# Patient Record
Sex: FEMALE | Race: WHITE | Employment: UNEMPLOYED | URBAN - METROPOLITAN AREA
[De-identification: names, ages, dates, MRNs, and addresses within clinical notes are randomized per-mention and may not be internally consistent; named-entity substitution may affect disease eponyms.]

---

## 2017-12-01 ENCOUNTER — HOSPITAL ENCOUNTER (OUTPATIENT)
Dept: GENERAL RADIOLOGY | Age: 22
Discharge: HOME | End: 2017-12-01
Payer: MEDICARE

## 2017-12-01 ENCOUNTER — TELEPHONE (OUTPATIENT)
Dept: FAMILY MEDICINE CLINIC | Age: 22
End: 2017-12-01

## 2017-12-01 ENCOUNTER — OFFICE VISIT (OUTPATIENT)
Dept: FAMILY MEDICINE CLINIC | Age: 22
End: 2017-12-01
Payer: MEDICARE

## 2017-12-01 ENCOUNTER — HOSPITAL ENCOUNTER (OUTPATIENT)
Age: 22
Discharge: HOME | End: 2017-12-01
Payer: MEDICARE

## 2017-12-01 VITALS
DIASTOLIC BLOOD PRESSURE: 74 MMHG | HEART RATE: 84 BPM | OXYGEN SATURATION: 98 % | SYSTOLIC BLOOD PRESSURE: 116 MMHG | HEIGHT: 63 IN | BODY MASS INDEX: 28.97 KG/M2 | WEIGHT: 163.5 LBS | TEMPERATURE: 97.8 F

## 2017-12-01 DIAGNOSIS — G89.29 CHRONIC LOW BACK PAIN, UNSPECIFIED BACK PAIN LATERALITY, WITH SCIATICA PRESENCE UNSPECIFIED: ICD-10-CM

## 2017-12-01 DIAGNOSIS — Z11.4 SCREENING FOR HIV (HUMAN IMMUNODEFICIENCY VIRUS): ICD-10-CM

## 2017-12-01 DIAGNOSIS — M54.5 CHRONIC LOW BACK PAIN, UNSPECIFIED BACK PAIN LATERALITY, WITH SCIATICA PRESENCE UNSPECIFIED: ICD-10-CM

## 2017-12-01 DIAGNOSIS — Z00.00 ANNUAL PHYSICAL EXAM: Primary | ICD-10-CM

## 2017-12-01 DIAGNOSIS — N63.0 BREAST MASS IN FEMALE: ICD-10-CM

## 2017-12-01 DIAGNOSIS — Z00.00 ANNUAL PHYSICAL EXAM: ICD-10-CM

## 2017-12-01 DIAGNOSIS — N63.10 BREAST MASS, RIGHT: Primary | ICD-10-CM

## 2017-12-01 LAB
ABSOLUTE EOS #: 0.25 K/UL (ref 0–0.44)
ABSOLUTE IMMATURE GRANULOCYTE: 0.03 K/UL (ref 0–0.3)
ABSOLUTE LYMPH #: 2.77 K/UL (ref 1.1–3.7)
ABSOLUTE MONO #: 0.52 K/UL (ref 0.1–1.2)
ALBUMIN SERPL-MCNC: 4.3 G/DL (ref 3.5–5.2)
ALBUMIN/GLOBULIN RATIO: 1.4 (ref 1–2.5)
ALP BLD-CCNC: 71 U/L (ref 35–104)
ALT SERPL-CCNC: 46 U/L (ref 5–33)
ANION GAP SERPL CALCULATED.3IONS-SCNC: 12 MMOL/L (ref 9–17)
AST SERPL-CCNC: 30 U/L
BASOPHILS # BLD: 1 % (ref 0–2)
BASOPHILS ABSOLUTE: 0.05 K/UL (ref 0–0.2)
BILIRUB SERPL-MCNC: 0.48 MG/DL (ref 0.3–1.2)
BUN BLDV-MCNC: 10 MG/DL (ref 6–20)
BUN/CREAT BLD: ABNORMAL (ref 9–20)
CALCIUM SERPL-MCNC: 9 MG/DL (ref 8.6–10.4)
CHLORIDE BLD-SCNC: 101 MMOL/L (ref 98–107)
CHOLESTEROL/HDL RATIO: 3.1
CHOLESTEROL: 179 MG/DL
CO2: 25 MMOL/L (ref 20–31)
CONTROL: PRESENT
CREAT SERPL-MCNC: 0.48 MG/DL (ref 0.5–0.9)
DIFFERENTIAL TYPE: NORMAL
EOSINOPHILS RELATIVE PERCENT: 3 % (ref 1–4)
GFR AFRICAN AMERICAN: >60 ML/MIN
GFR NON-AFRICAN AMERICAN: >60 ML/MIN
GFR SERPL CREATININE-BSD FRML MDRD: ABNORMAL ML/MIN/{1.73_M2}
GFR SERPL CREATININE-BSD FRML MDRD: ABNORMAL ML/MIN/{1.73_M2}
GLUCOSE BLD-MCNC: 80 MG/DL (ref 70–99)
HCT VFR BLD CALC: 38.4 % (ref 36.3–47.1)
HDLC SERPL-MCNC: 57 MG/DL
HEMOGLOBIN: 12 G/DL (ref 11.9–15.1)
HIV AG/AB: NONREACTIVE
IMMATURE GRANULOCYTES: 0 %
LDL CHOLESTEROL: 108 MG/DL (ref 0–130)
LYMPHOCYTES # BLD: 36 % (ref 24–43)
MCH RBC QN AUTO: 28.7 PG (ref 25.2–33.5)
MCHC RBC AUTO-ENTMCNC: 31.3 G/DL (ref 28.4–34.8)
MCV RBC AUTO: 91.9 FL (ref 82.6–102.9)
MONOCYTES # BLD: 7 % (ref 3–12)
PDW BLD-RTO: 12.9 % (ref 11.8–14.4)
PLATELET # BLD: 285 K/UL (ref 138–453)
PLATELET ESTIMATE: NORMAL
PMV BLD AUTO: 10.5 FL (ref 8.1–13.5)
POTASSIUM SERPL-SCNC: 4.1 MMOL/L (ref 3.7–5.3)
PREGNANCY TEST URINE, POC: NORMAL
RBC # BLD: 4.18 M/UL (ref 3.95–5.11)
RBC # BLD: NORMAL 10*6/UL
SEG NEUTROPHILS: 53 % (ref 36–65)
SEGMENTED NEUTROPHILS ABSOLUTE COUNT: 4.11 K/UL (ref 1.5–8.1)
SODIUM BLD-SCNC: 138 MMOL/L (ref 135–144)
TOTAL PROTEIN: 7.4 G/DL (ref 6.4–8.3)
TRIGL SERPL-MCNC: 68 MG/DL
TSH SERPL DL<=0.05 MIU/L-ACNC: 2.85 MIU/L (ref 0.3–5)
VITAMIN D 25-HYDROXY: 8.3 NG/ML (ref 30–100)
VLDLC SERPL CALC-MCNC: NORMAL MG/DL (ref 1–30)
WBC # BLD: 7.7 K/UL (ref 3.5–11.3)
WBC # BLD: NORMAL 10*3/UL

## 2017-12-01 PROCEDURE — 87389 HIV-1 AG W/HIV-1&-2 AB AG IA: CPT

## 2017-12-01 PROCEDURE — G8427 DOCREV CUR MEDS BY ELIG CLIN: HCPCS | Performed by: PHYSICIAN ASSISTANT

## 2017-12-01 PROCEDURE — G8484 FLU IMMUNIZE NO ADMIN: HCPCS | Performed by: PHYSICIAN ASSISTANT

## 2017-12-01 PROCEDURE — 36415 COLL VENOUS BLD VENIPUNCTURE: CPT

## 2017-12-01 PROCEDURE — 72220 X-RAY EXAM SACRUM TAILBONE: CPT

## 2017-12-01 PROCEDURE — 85025 COMPLETE CBC W/AUTO DIFF WBC: CPT

## 2017-12-01 PROCEDURE — 1036F TOBACCO NON-USER: CPT | Performed by: PHYSICIAN ASSISTANT

## 2017-12-01 PROCEDURE — 81025 URINE PREGNANCY TEST: CPT | Performed by: PHYSICIAN ASSISTANT

## 2017-12-01 PROCEDURE — 80053 COMPREHEN METABOLIC PANEL: CPT

## 2017-12-01 PROCEDURE — 72100 X-RAY EXAM L-S SPINE 2/3 VWS: CPT

## 2017-12-01 PROCEDURE — 80061 LIPID PANEL: CPT

## 2017-12-01 PROCEDURE — 82306 VITAMIN D 25 HYDROXY: CPT

## 2017-12-01 PROCEDURE — 99203 OFFICE O/P NEW LOW 30 MIN: CPT | Performed by: PHYSICIAN ASSISTANT

## 2017-12-01 PROCEDURE — 84443 ASSAY THYROID STIM HORMONE: CPT

## 2017-12-01 PROCEDURE — G8419 CALC BMI OUT NRM PARAM NOF/U: HCPCS | Performed by: PHYSICIAN ASSISTANT

## 2017-12-01 RX ORDER — MELOXICAM 7.5 MG/1
7.5 TABLET ORAL DAILY
Qty: 30 TABLET | Refills: 3 | Status: SHIPPED | OUTPATIENT
Start: 2017-12-01 | End: 2018-03-06 | Stop reason: ALTCHOICE

## 2017-12-01 ASSESSMENT — ENCOUNTER SYMPTOMS
EYE DISCHARGE: 0
RHINORRHEA: 0
ABDOMINAL PAIN: 0
EYE ITCHING: 0
BACK PAIN: 1
BOWEL INCONTINENCE: 0
COUGH: 0
VOMITING: 0
SHORTNESS OF BREATH: 0
CHEST TIGHTNESS: 0
SINUS PRESSURE: 0
DIARRHEA: 0
SORE THROAT: 0
NAUSEA: 0

## 2017-12-01 ASSESSMENT — PATIENT HEALTH QUESTIONNAIRE - PHQ9
2. FEELING DOWN, DEPRESSED OR HOPELESS: 0
SUM OF ALL RESPONSES TO PHQ QUESTIONS 1-9: 0
SUM OF ALL RESPONSES TO PHQ9 QUESTIONS 1 & 2: 0
1. LITTLE INTEREST OR PLEASURE IN DOING THINGS: 0

## 2017-12-01 NOTE — PROGRESS NOTES
ear discharge, ear pain, postnasal drip, rhinorrhea, sinus pressure and sore throat. Eyes: Negative for discharge and itching. Respiratory: Negative for cough, chest tightness and shortness of breath. Cardiovascular: Negative for chest pain, palpitations and leg swelling. Gastrointestinal: Negative for abdominal pain, bowel incontinence, diarrhea, nausea and vomiting. Genitourinary: Negative for bladder incontinence, dysuria and frequency. Musculoskeletal: Positive for back pain. Negative for neck pain and neck stiffness. Skin: Negative for rash. Neurological: Negative for dizziness, weakness, light-headedness, numbness and headaches. All other systems reviewed and are negative. Objective:     Physical Exam   Constitutional: She is oriented to person, place, and time. She appears well-developed and well-nourished. No distress. /74   Pulse 84   Temp 97.8 °F (36.6 °C) (Oral)   Ht 5' 3\" (1.6 m)   Wt 163 lb 8 oz (74.2 kg)   LMP 11/28/2017   SpO2 98%   Breastfeeding? No   BMI 28.96 kg/m²      HENT:   Head: Normocephalic and atraumatic. Right Ear: External ear normal.   Left Ear: External ear normal.   Nose: Nose normal.   Mouth/Throat: Oropharynx is clear and moist.   Eyes: Conjunctivae and EOM are normal. Pupils are equal, round, and reactive to light. Right eye exhibits no discharge. Left eye exhibits no discharge. No scleral icterus. Neck: Normal range of motion. Neck supple. No tracheal deviation present. No thyromegaly present. Cardiovascular: Normal rate, regular rhythm and normal heart sounds. Exam reveals no gallop and no friction rub. No murmur heard. Pulmonary/Chest: Effort normal and breath sounds normal. No stridor. No respiratory distress. She has no wheezes. She has no rales. She exhibits no tenderness. Abdominal: Soft. Bowel sounds are normal. She exhibits no distension. There is no tenderness. There is no rebound and no guarding.    Musculoskeletal: She

## 2017-12-04 DIAGNOSIS — E55.9 VITAMIN D DEFICIENCY: Primary | ICD-10-CM

## 2017-12-04 RX ORDER — CHOLECALCIFEROL (VITAMIN D3) 1250 MCG
50000 CAPSULE ORAL WEEKLY
Qty: 12 CAPSULE | Refills: 0 | Status: SHIPPED | OUTPATIENT
Start: 2017-12-04 | End: 2018-03-06 | Stop reason: ALTCHOICE

## 2018-01-03 ENCOUNTER — HOSPITAL ENCOUNTER (OUTPATIENT)
Age: 23
Setting detail: SPECIMEN
Discharge: HOME | End: 2018-01-03
Payer: MEDICARE

## 2018-01-03 ENCOUNTER — OFFICE VISIT (OUTPATIENT)
Dept: OBGYN CLINIC | Age: 23
End: 2018-01-03
Payer: MEDICARE

## 2018-01-03 VITALS
SYSTOLIC BLOOD PRESSURE: 112 MMHG | OXYGEN SATURATION: 98 % | BODY MASS INDEX: 31.28 KG/M2 | HEART RATE: 93 BPM | RESPIRATION RATE: 14 BRPM | HEIGHT: 62 IN | TEMPERATURE: 98.1 F | WEIGHT: 170 LBS | DIASTOLIC BLOOD PRESSURE: 75 MMHG

## 2018-01-03 DIAGNOSIS — Z78.9 NONSMOKER: ICD-10-CM

## 2018-01-03 DIAGNOSIS — N92.6 MISSED MENSES: ICD-10-CM

## 2018-01-03 DIAGNOSIS — N92.6 MISSED MENSES: Primary | ICD-10-CM

## 2018-01-03 LAB
CONTROL: PRESENT
GLUCOSE ADMINISTRATION: NORMAL
GLUCOSE TOLERANCE SCREEN 50G: 113 MG/DL (ref 70–135)
PREGNANCY TEST URINE, POC: POSITIVE

## 2018-01-03 PROCEDURE — 1036F TOBACCO NON-USER: CPT | Performed by: NURSE PRACTITIONER

## 2018-01-03 PROCEDURE — G8427 DOCREV CUR MEDS BY ELIG CLIN: HCPCS | Performed by: NURSE PRACTITIONER

## 2018-01-03 PROCEDURE — 81025 URINE PREGNANCY TEST: CPT | Performed by: NURSE PRACTITIONER

## 2018-01-03 PROCEDURE — 99202 OFFICE O/P NEW SF 15 MIN: CPT | Performed by: NURSE PRACTITIONER

## 2018-01-03 PROCEDURE — G8484 FLU IMMUNIZE NO ADMIN: HCPCS | Performed by: NURSE PRACTITIONER

## 2018-01-03 PROCEDURE — G8417 CALC BMI ABV UP PARAM F/U: HCPCS | Performed by: NURSE PRACTITIONER

## 2018-01-03 RX ORDER — PNV NO.95/FERROUS FUM/FOLIC AC 28MG-0.8MG
1 TABLET ORAL DAILY
Qty: 30 TABLET | Refills: 12 | Status: SHIPPED | OUTPATIENT
Start: 2018-01-03 | End: 2018-03-06 | Stop reason: ALTCHOICE

## 2018-01-03 NOTE — PATIENT INSTRUCTIONS
Now\" link. Current as of: March 16, 2017  Content Version: 11.4  © 1810-5663 "[employer]". Care instructions adapted under license by [facility] ([facility]). If you have questions about a medical condition or this instruction, always ask your healthcare professional. [address] any warranty or liability for your use of this information. Patient Education        Learning About When to Call Your Doctor During Pregnancy (Up to 20 Weeks)  Your Care Instructions    It's common to have concerns about what might be a problem during pregnancy. Although most pregnant women don't have any serious problems, it's important to know when to call your doctor if you have certain symptoms. These are general suggestions. Your doctor may give you some more information about when to call. When to call your doctor (up to 20 weeks)  Call 911 anytime you think you may need emergency care. For example, call if:  · You passed out (lost consciousness). Call your doctor now or seek immediate medical care if:  · You have a fever. · You have vaginal bleeding. · You are dizzy or lightheaded, or you feel like you may faint. · You have symptoms of a urinary tract infection. These may include:  ¨ Pain or burning when you urinate. ¨ A frequent need to urinate without being able to pass much urine. ¨ Pain in the flank, which is just below the rib cage and above the waist on either side of the back. ¨ Blood in your urine. · You have belly pain. · You think you are having contractions. · You have a sudden release of fluid from your vagina. Watch closely for changes in your health, and be sure to contact your doctor if:  · You have vaginal discharge that smells bad. · You have other concerns about your pregnancy. Follow-up care is a key part of your treatment and safety. Be sure to make and go to all appointments, and call your doctor if you are having problems.  It's also a good idea to know your test results and keep a list of the medicines you take. Where can you learn more? Go to https://chpepiceweb.[email or web address] org and sign in to your [employer] account. Enter L172 in the [facility] box to learn more about \"Learning About When to Call Your Doctor During Pregnancy (Up to 20 Weeks). \"     If you do not have an account, please click on the \"Sign Up Now\" link. Current as of: March 16, 2017  Content Version: 11.4  © 2233-7777 [employer]. Care instructions adapted under license by [facility] ([facility]). If you have questions about a medical condition or this instruction, always ask your healthcare professional. [address] any warranty or liability for your use of this information. Patient Education        Nutrition During Pregnancy: Care Instructions  Your Care Instructions    Healthy eating when you are pregnant is important for you and your baby. It can help you feel well and have a successful pregnancy and delivery. During pregnancy your nutrition needs increase. Even if you have excellent eating habits, your doctor may recommend a multivitamin to make sure you get enough iron and folic acid. Many pregnant women wonder how much weight they should gain. In general, women who were at a healthy weight before they became pregnant should gain between 25 and 35 pounds. Women who were overweight before pregnancy are usually advised to gain 15 to 25 pounds. Women who were underweight before pregnancy are usually advised to gain 28 to 40 pounds. Your doctor will work with you to set a weight goal that is right for you. Gaining a healthy amount of weight helps you have a healthy baby. Follow-up care is a key part of your treatment and safety. Be sure to make and go to all appointments, and call your doctor if you are having problems. It's also a good idea to know your test results and keep a list of the medicines you take. How can you care for yourself at home?   · Eat

## 2018-01-22 ENCOUNTER — HOSPITAL ENCOUNTER (OUTPATIENT)
Age: 23
Discharge: HOME | End: 2018-01-22
Payer: MEDICARE

## 2018-01-22 ENCOUNTER — HOSPITAL ENCOUNTER (OUTPATIENT)
Dept: ULTRASOUND IMAGING | Age: 23
Discharge: HOME | End: 2018-01-22
Payer: MEDICARE

## 2018-01-22 DIAGNOSIS — O20.0 THREATENED MISCARRIAGE: ICD-10-CM

## 2018-01-22 DIAGNOSIS — O20.0 THREATENED MISCARRIAGE: Primary | ICD-10-CM

## 2018-01-22 DIAGNOSIS — N92.6 MISSED MENSES: ICD-10-CM

## 2018-01-22 LAB — HCG QUANTITATIVE: ABNORMAL IU/L

## 2018-01-22 PROCEDURE — 84702 CHORIONIC GONADOTROPIN TEST: CPT

## 2018-01-22 PROCEDURE — 76817 TRANSVAGINAL US OBSTETRIC: CPT

## 2018-01-22 PROCEDURE — 76801 OB US < 14 WKS SINGLE FETUS: CPT

## 2018-01-22 PROCEDURE — 36415 COLL VENOUS BLD VENIPUNCTURE: CPT

## 2018-01-25 ENCOUNTER — OFFICE VISIT (OUTPATIENT)
Dept: OBGYN CLINIC | Age: 23
End: 2018-01-25
Payer: MEDICARE

## 2018-01-25 ENCOUNTER — HOSPITAL ENCOUNTER (OUTPATIENT)
Age: 23
Setting detail: SPECIMEN
Discharge: HOME | End: 2018-01-25
Payer: MEDICARE

## 2018-01-25 VITALS
DIASTOLIC BLOOD PRESSURE: 70 MMHG | SYSTOLIC BLOOD PRESSURE: 118 MMHG | HEART RATE: 89 BPM | WEIGHT: 172 LBS | BODY MASS INDEX: 31.46 KG/M2 | TEMPERATURE: 98.4 F

## 2018-01-25 DIAGNOSIS — O02.1 MISSED ABORTION: Primary | ICD-10-CM

## 2018-01-25 DIAGNOSIS — O02.1 MISSED ABORTION: ICD-10-CM

## 2018-01-25 LAB
ABO/RH: NORMAL
ANTIBODY SCREEN: NEGATIVE
HCG QUANTITATIVE: ABNORMAL IU/L

## 2018-01-25 PROCEDURE — G8417 CALC BMI ABV UP PARAM F/U: HCPCS | Performed by: ADVANCED PRACTICE MIDWIFE

## 2018-01-25 PROCEDURE — 99213 OFFICE O/P EST LOW 20 MIN: CPT | Performed by: ADVANCED PRACTICE MIDWIFE

## 2018-01-25 PROCEDURE — G8427 DOCREV CUR MEDS BY ELIG CLIN: HCPCS | Performed by: ADVANCED PRACTICE MIDWIFE

## 2018-01-25 PROCEDURE — G8484 FLU IMMUNIZE NO ADMIN: HCPCS | Performed by: ADVANCED PRACTICE MIDWIFE

## 2018-01-25 PROCEDURE — 1036F TOBACCO NON-USER: CPT | Performed by: ADVANCED PRACTICE MIDWIFE

## 2018-01-25 NOTE — PROGRESS NOTES
CC: Follow up for US    HPI: Reviewed US and labs and discussed likelihood of fetal demise. Here w  and mother. She was tearful, they appear supportive. Explained labs and US results and expectations. Will repeat labs and check blood type as she is not sure of hers. Discussed expectant management if labs indicate miscarriage. Questions answered. She is hopeful and they desire pregnancy fairly soon if this does result in miscarriage. Questions answered about expectant management, [facility] is aware to call w any concerns. Dx: Missed AB    Plan: Labs and observation  Call w concerns  Return in 2 weeks if miscarriage    Over 50% of this 15 min visit spent on education and counseling regarding miscarriage and expectant management.

## 2018-01-29 ENCOUNTER — TELEPHONE (OUTPATIENT)
Dept: OBGYN CLINIC | Age: 23
End: 2018-01-29

## 2018-02-11 ENCOUNTER — TELEPHONE (OUTPATIENT)
Dept: OBGYN CLINIC | Age: 23
End: 2018-02-11

## 2018-02-13 ENCOUNTER — HOSPITAL ENCOUNTER (OUTPATIENT)
Age: 23
Setting detail: SPECIMEN
Discharge: HOME | End: 2018-02-13
Payer: MEDICARE

## 2018-02-13 ENCOUNTER — OFFICE VISIT (OUTPATIENT)
Dept: OBGYN CLINIC | Age: 23
End: 2018-02-13
Payer: MEDICARE

## 2018-02-13 VITALS
BODY MASS INDEX: 31.54 KG/M2 | TEMPERATURE: 98.1 F | RESPIRATION RATE: 14 BRPM | OXYGEN SATURATION: 98 % | HEIGHT: 63 IN | SYSTOLIC BLOOD PRESSURE: 126 MMHG | WEIGHT: 178 LBS | DIASTOLIC BLOOD PRESSURE: 78 MMHG | HEART RATE: 97 BPM

## 2018-02-13 DIAGNOSIS — Z67.20 TYPE B BLOOD, RH POSITIVE: ICD-10-CM

## 2018-02-13 DIAGNOSIS — O03.9 MISCARRIAGE: ICD-10-CM

## 2018-02-13 DIAGNOSIS — O03.9 MISCARRIAGE: Primary | ICD-10-CM

## 2018-02-13 LAB — HCG QUANTITATIVE: 327 IU/L

## 2018-02-13 PROCEDURE — G8427 DOCREV CUR MEDS BY ELIG CLIN: HCPCS | Performed by: NURSE PRACTITIONER

## 2018-02-13 PROCEDURE — G8484 FLU IMMUNIZE NO ADMIN: HCPCS | Performed by: NURSE PRACTITIONER

## 2018-02-13 PROCEDURE — 99213 OFFICE O/P EST LOW 20 MIN: CPT | Performed by: NURSE PRACTITIONER

## 2018-02-13 PROCEDURE — 1036F TOBACCO NON-USER: CPT | Performed by: NURSE PRACTITIONER

## 2018-02-13 PROCEDURE — G8417 CALC BMI ABV UP PARAM F/U: HCPCS | Performed by: NURSE PRACTITIONER

## 2018-02-13 ASSESSMENT — ENCOUNTER SYMPTOMS
RESPIRATORY NEGATIVE: 1
GASTROINTESTINAL NEGATIVE: 1
ALLERGIC/IMMUNOLOGIC NEGATIVE: 1
EYES NEGATIVE: 1

## 2018-02-13 NOTE — PROGRESS NOTES
Subjective:      Patient ID: Ana Velez is a 23 y.o. female. HPI  Taya Hernadez started Feb 1st with spotting which has led to a miscarriage. She reports Wednesday last week was her worst day where she had major cramping and passed blood clots and tissue. She spoke with the midwives and was reassured. She reports she is having what she calls a \"normal period\" right now. She is wondering for how long this is going to last.    Review of Systems   Constitutional: Negative. HENT: Negative. Eyes: Negative. Respiratory: Negative. Cardiovascular: Negative. Gastrointestinal: Negative. Endocrine: Negative. Genitourinary: Positive for vaginal bleeding. Musculoskeletal: Negative. Skin: Negative. Allergic/Immunologic: Negative. Neurological: Negative. Hematological: Negative. Psychiatric/Behavioral: Negative. Objective:   Physical Exam   Constitutional: She is oriented to person, place, and time. She appears well-developed and well-nourished. /78 (Site: Right Arm, Position: Sitting, Cuff Size: Large Adult)   Pulse 97   Temp 98.1 °F (36.7 °C) (Oral)   Resp 14   Ht 5' 3\" (1.6 m)   Wt 178 lb (80.7 kg)   SpO2 98%   BMI 31.53 kg/m²   Body mass index is 31.53 kg/m². HENT:   Head: Normocephalic and atraumatic. Eyes: Conjunctivae are normal.   Neck: Normal range of motion. Cardiovascular: Normal rate and regular rhythm. Pulmonary/Chest: Effort normal.   Abdominal: Soft. Genitourinary:   Genitourinary Comments: deferred   Musculoskeletal: Normal range of motion. Neurological: She is alert and oriented to person, place, and time. Skin: Skin is warm and dry. Psychiatric: She has a normal mood and affect. Her behavior is normal. Judgment and thought content normal.       Assessment:      1. Miscarriage, draw beta and trend  2. Strict miscarriage protocol given and patient verbalizes understanding  3.   Support and education      Plan:      Patient was

## 2018-02-13 NOTE — PATIENT INSTRUCTIONS
can.  · Do not have sex until the bleeding stops. · You may return to your normal activities if you feel well enough to do so. But you should avoid heavy exercise until the bleeding stops. · If you plan to get pregnant again, check with your doctor. Most doctors suggest waiting until you have had at least one normal period before you try to get pregnant. · If you do not want to get pregnant, ask your doctor about birth control. You can get pregnant again before your next period starts if you are not using birth control. · You may be low in iron because of blood loss. Eat a balanced diet that is high in iron and vitamin C. Foods rich in iron include red meat, shellfish, eggs, beans, and leafy green vegetables. Foods high in vitamin C include citrus fruits, tomatoes, and broccoli. Talk to your doctor about whether you need to take iron pills or a multivitamin. · The loss of a pregnancy can be very hard. You may wonder why it happened and blame yourself. Talking to family members, friends, a counselor, or your doctor may help you cope with your loss. When should you call for help? Call 911 anytime you think you may need emergency care. For example, call if:  ? · You passed out (lost consciousness). ?Call your doctor now or seek immediate medical care if:  ? · You have severe vaginal bleeding. ? · You are dizzy or lightheaded, or you feel like you may faint. ? · You have new or worse pain in your belly or pelvis. ? · You have a fever. ? · You have vaginal discharge that smells bad. ? Watch closely for changes in your health, and be sure to contact your doctor if:  ? · You do not get better as expected. Where can you learn more? Go to [email or web address]://TapTalentsliza.Functional Neuromodulation. org and sign in to your [employer] account. Enter E802 in the [employer] box to learn more about \"Miscarriage: Care Instructions. \"     If you do not have an account, please click on the \"Sign Up Now\" link.   Current as of:

## 2018-02-19 ENCOUNTER — NURSE ONLY (OUTPATIENT)
Dept: OBGYN CLINIC | Age: 23
End: 2018-02-19
Payer: MEDICARE

## 2018-02-19 ENCOUNTER — HOSPITAL ENCOUNTER (OUTPATIENT)
Age: 23
Setting detail: SPECIMEN
Discharge: HOME | End: 2018-02-19
Payer: MEDICARE

## 2018-02-19 VITALS
OXYGEN SATURATION: 99 % | RESPIRATION RATE: 14 BRPM | DIASTOLIC BLOOD PRESSURE: 72 MMHG | BODY MASS INDEX: 31.18 KG/M2 | SYSTOLIC BLOOD PRESSURE: 122 MMHG | HEIGHT: 63 IN | WEIGHT: 176 LBS | HEART RATE: 72 BPM

## 2018-02-19 DIAGNOSIS — O02.1 MISSED ABORTION: ICD-10-CM

## 2018-02-19 DIAGNOSIS — O02.1 MISSED ABORTION: Primary | ICD-10-CM

## 2018-02-19 LAB — HCG QUANTITATIVE: 29 IU/L

## 2018-02-19 PROCEDURE — 36415 COLL VENOUS BLD VENIPUNCTURE: CPT | Performed by: NURSE PRACTITIONER

## 2018-03-05 ENCOUNTER — HOSPITAL ENCOUNTER (OUTPATIENT)
Age: 23
Setting detail: SPECIMEN
Discharge: HOME | End: 2018-03-05
Payer: MEDICARE

## 2018-03-05 ENCOUNTER — NURSE ONLY (OUTPATIENT)
Dept: OBGYN CLINIC | Age: 23
End: 2018-03-05
Payer: MEDICARE

## 2018-03-05 VITALS
RESPIRATION RATE: 14 BRPM | HEART RATE: 69 BPM | OXYGEN SATURATION: 98 % | SYSTOLIC BLOOD PRESSURE: 122 MMHG | WEIGHT: 175 LBS | DIASTOLIC BLOOD PRESSURE: 69 MMHG | BODY MASS INDEX: 31.01 KG/M2 | HEIGHT: 63 IN

## 2018-03-05 DIAGNOSIS — O02.1 MISSED ABORTION: ICD-10-CM

## 2018-03-05 DIAGNOSIS — O02.1 MISSED ABORTION: Primary | ICD-10-CM

## 2018-03-05 LAB — HCG QUANTITATIVE: 2 IU/L

## 2018-03-05 PROCEDURE — 36415 COLL VENOUS BLD VENIPUNCTURE: CPT | Performed by: NURSE PRACTITIONER

## 2018-03-06 ENCOUNTER — OFFICE VISIT (OUTPATIENT)
Dept: FAMILY MEDICINE CLINIC | Age: 23
End: 2018-03-06
Payer: MEDICARE

## 2018-03-06 VITALS
WEIGHT: 173 LBS | HEIGHT: 62 IN | HEART RATE: 90 BPM | OXYGEN SATURATION: 98 % | TEMPERATURE: 97.9 F | DIASTOLIC BLOOD PRESSURE: 78 MMHG | BODY MASS INDEX: 31.83 KG/M2 | SYSTOLIC BLOOD PRESSURE: 124 MMHG

## 2018-03-06 DIAGNOSIS — M54.5 CHRONIC LEFT-SIDED LOW BACK PAIN, WITH SCIATICA PRESENCE UNSPECIFIED: ICD-10-CM

## 2018-03-06 DIAGNOSIS — G89.29 CHRONIC LEFT-SIDED LOW BACK PAIN, WITH SCIATICA PRESENCE UNSPECIFIED: ICD-10-CM

## 2018-03-06 DIAGNOSIS — E55.9 VITAMIN D DEFICIENCY: Primary | ICD-10-CM

## 2018-03-06 PROCEDURE — G8417 CALC BMI ABV UP PARAM F/U: HCPCS | Performed by: PHYSICIAN ASSISTANT

## 2018-03-06 PROCEDURE — 99213 OFFICE O/P EST LOW 20 MIN: CPT | Performed by: PHYSICIAN ASSISTANT

## 2018-03-06 PROCEDURE — G8427 DOCREV CUR MEDS BY ELIG CLIN: HCPCS | Performed by: PHYSICIAN ASSISTANT

## 2018-03-06 PROCEDURE — 1036F TOBACCO NON-USER: CPT | Performed by: PHYSICIAN ASSISTANT

## 2018-03-06 PROCEDURE — G8484 FLU IMMUNIZE NO ADMIN: HCPCS | Performed by: PHYSICIAN ASSISTANT

## 2018-03-06 RX ORDER — CHOLECALCIFEROL (VITAMIN D3) 1250 MCG
50000 CAPSULE ORAL WEEKLY
Qty: 12 CAPSULE | Refills: 0 | Status: SHIPPED | OUTPATIENT
Start: 2018-03-06 | End: 2018-07-02 | Stop reason: SDUPTHER

## 2018-03-06 ASSESSMENT — ENCOUNTER SYMPTOMS
SHORTNESS OF BREATH: 0
DIARRHEA: 0
SORE THROAT: 0
EYE ITCHING: 0
ABDOMINAL PAIN: 0
BOWEL INCONTINENCE: 0
RHINORRHEA: 0
CHEST TIGHTNESS: 0
BACK PAIN: 1
EYE DISCHARGE: 0
VOMITING: 0
SINUS PRESSURE: 0
NAUSEA: 0
COUGH: 0

## 2018-03-06 NOTE — PATIENT INSTRUCTIONS
directed. · Do not take two or more pain medicines at the same time unless the doctor told you to. Many pain medicines have acetaminophen, which is Tylenol. Too much acetaminophen (Tylenol) can be harmful. · To prevent future back pain, do exercises to stretch and strengthen your back and stomach. Learn how to use good posture, safe lifting techniques, and proper body mechanics. When should you call for help? Call 911 anytime you think you may need emergency care. For example, call if:  ? · You are unable to move a leg at all. ?Call your doctor now or seek immediate medical care if:  ? · You have new or worse symptoms in your legs or buttocks. Symptoms may include:  ¨ Numbness or tingling. ¨ Weakness. ¨ Pain. ? · You lose bladder or bowel control. ? Watch closely for changes in your health, and be sure to contact your doctor if:  ? · You are not getting better as expected. Where can you learn more? Go to https://PopularMedia.Insightix. org and sign in to your [employer] account. Enter N898 in the [employer] box to learn more about \"Sacroiliac Joint Pain: Care Instructions. \"     If you do not have an account, please click on the \"Sign Up Now\" link. Current as of: March 21, 2017  Content Version: 11.5  © 9747-7208 [employer]. Care instructions adapted under license by [facility] ([facility]). If you have questions about a medical condition or this instruction, always ask your healthcare professional. [place] [address] any warranty or liability for your use of this information. Patient Education        Low Back Pain: Exercises  Your Care Instructions  Here are some examples of typical rehabilitation exercises for your condition. Start each exercise slowly. Ease off the exercise if you start to have pain. Your doctor or physical therapist will tell you when you can start these exercises and which ones will work best for you.   How to do the when I have back pain. \" Staying active won't hurt you. It may help you get better faster. \"I need prescription pain medicine. \"  It's best to try to let time and being active heal your back. Opioid pain medicines-such as hydrocodone or oxycodone-usually don't work any better than over-the-counter medicines like ibuprofen or naproxen. And opioids can cause serious problems like addiction or overdose. \"I need a test like an X-ray or an MRI to diagnose my low back pain. \" Getting a test right away won't help you get better faster. And it could lead you down a treatment path you may not need, since most people get better on their own.      What causes low back pain? In most cases, there isn't a clear cause. This can be frustrating, because your back hurts and there's no obvious reason. Your back pain can be caused by:  Overuse or muscle strain. This can happen from playing sports, lifting heavy things, or not being physically fit. A herniated disc. This is a problem with the cushion between the bones in your back. Arthritis. With age, you may have changes in your bones that can narrow the space around your nerves. Other causes. In rare cases, the cause is a serious illness like an infection or cancer. But there are usually other symptoms too. What are the symptoms? Your symptoms depend on your body and the cause of your back pain. You may feel:  · Pain that's sharp or dull. It may be in one small area or over a broad area. But even bad pain doesn't mean that it's caused by something serious. · Leg pain, numbness, or tingling. When a nerve gets squeezed-such as from a disc problem or arthritis-you may have symptoms in your leg or foot. You can even have leg symptoms from a back problem without having any pain in your back. How is low back pain diagnosed? A physical exam is the main way to diagnose low back pain.  Your doctor may examine your back, check your nerves by testing your reflexes, and make sure that your muscles are strong. He or she also will ask questions about your back and overall health. Most people don't need any tests right away. Tests often don't show the reason for your pain. If your pain lasts more than 6 weeks or you have symptoms that your doctor is more concerned about, he or she may order tests. These may include an X-ray, a CT scan, or an MRI. In some cases, tests can help your doctor find a cause for your pain, especially for pain in one or both legs. How is low back pain treated? Most acute low back pain gets better on its own within a few weeks, no matter what the cause. Time and doing usual activities are all that most people need to feel better. Using heat or ice and taking over-the-counter pain medicine also can help while your body heals. If you aren't getting better on your own or your pain is very bad, your doctor may recommend:  · Physical therapy. · Spinal manipulation, such as by a chiropractor. · Acupuncture. · Massage. · Injections of steroid medicine in your back (especially for pain that involves your legs). If you have chronic low back pain, treatment will help you understand and manage your pain. Treatment may include:  · Staying active. This may include walking or doing back exercises. · Physical therapy. · Medicines. Some of these medicines are also used for other problems, like seizures or depression. · Pain management. Your doctor may have you see a pain specialist.  · Counseling. Having chronic pain can be hard. It may help to talk to someone who can help you cope with your pain. Surgery isn't needed for most people. But it may help some types of low back pain. Follow-up care is a key part of your treatment and safety. Be sure to make and go to all appointments, and call your doctor if you are having problems. It's also a good idea to know your test results and keep a list of the medicines you take. When should you call for help?   Call 911 anytime you think you may need emergency care. For example, call if:  · You can't move a leg at all. Call your doctor now or seek immediate medical care if:  · You have new or worse symptoms in your legs, belly, or buttocks. Symptoms may include:  ¨ Numbness or tingling. ¨ Weakness. ¨ Pain. · You lose bladder or bowel control. Watch closely for changes in your health, and be sure to contact your doctor if:  · Along with the back pain, you have a fever, lose weight, or don't feel well. · You do not get better as expected. Where can you learn more? Go to https://bSafe.AnyLeaf. org and sign in to your [employer] account. Enter A007 in the [employer] box to learn more about \"Learning About Low Back Pain. \"     If you do not have an account, please click on the \"Sign Up Now\" link. Current as of: August 17, 2017  Content Version: 11.5  © 0827-4417 Healthwise, Incorporated. Care instructions adapted under license by [facility] ([facility]). If you have questions about a medical condition or this instruction, always ask your healthcare professional. [place] [address] any warranty or liability for your use of this information.

## 2018-03-06 NOTE — PROGRESS NOTES
[facility]  [address] [place] [id number]  Dept: [phone]  Dept Fax: [phone]    Roslyn Sierra is a 23 y.o. female who presents today for her medical conditions/complaints as noted below. Roslyn Sierra is c/o of   Chief Complaint   Patient presents with    Tailbone Pain         HPI:     Back Pain   This is a new problem. The current episode started more than 1 month ago. The pain is present in the lumbar spine. The pain is at a severity of 4/10. The pain is moderate. Pertinent negatives include no abdominal pain, bladder incontinence, bowel incontinence, chest pain, dysuria, fever, headaches, numbness or weakness. No results found for: LABA1C          ( goal A1C is < 7)   No results found for: LABMICR  LDL Cholesterol (mg/dL)   Date Value   12/01/2017 108       (goal LDL is <100)   AST (U/L)   Date Value   12/01/2017 30     ALT (U/L)   Date Value   12/01/2017 46 (H)     BUN (mg/dL)   Date Value   12/01/2017 10     BP Readings from Last 3 Encounters:   03/06/18 124/78   03/05/18 122/69   02/19/18 122/72          (goal 120/80)    History reviewed. No pertinent past medical history. History reviewed. No pertinent surgical history. History reviewed. No pertinent family history. Social History   Substance Use Topics    Smoking status: Never Smoker    Smokeless tobacco: Never Used    Alcohol use No      Current Outpatient Prescriptions   Medication Sig Dispense Refill    Cholecalciferol (VITAMIN D3) 84689 units CAPS Take 50,000 Units by mouth once a week 12 capsule 0     No current facility-administered medications for this visit.       No Known Allergies    Health Maintenance   Topic Date Due    Chlamydia screen  03/14/2009    DTaP/Tdap/Td vaccine (1 - Tdap) 03/14/2012    Cervical cancer screen  03/14/2014    Flu vaccine (1) 03/06/2018 (Originally 9/1/2017)    HIV screen  Completed       Subjective:      Review of Systems Constitutional: Negative for chills, diaphoresis, fatigue and fever. HENT: Negative for congestion, ear discharge, ear pain, postnasal drip, rhinorrhea, sinus pressure and sore throat. Eyes: Negative for discharge and itching. Respiratory: Negative for cough, chest tightness and shortness of breath. Cardiovascular: Negative for chest pain, palpitations and leg swelling. Gastrointestinal: Negative for abdominal pain, bowel incontinence, diarrhea, nausea and vomiting. Genitourinary: Negative for bladder incontinence, dysuria and frequency. Musculoskeletal: Positive for back pain. Negative for neck pain and neck stiffness. Skin: Negative for rash. Neurological: Negative for dizziness, weakness, light-headedness, numbness and headaches. All other systems reviewed and are negative. Objective:     Physical Exam   Constitutional: She is oriented to person, place, and time. She appears well-developed and well-nourished. No distress. /74   Pulse 84   Temp 97.8 °F (36.6 °C) (Oral)   Ht 5' 3\" (1.6 m)   Wt 163 lb 8 oz (74.2 kg)   LMP 11/28/2017   SpO2 98%   Breastfeeding? No   BMI 28.96 kg/m²      HENT:   Head: Normocephalic and atraumatic. Right Ear: External ear normal.   Left Ear: External ear normal.   Nose: Nose normal.   Mouth/Throat: Oropharynx is clear and moist.   Eyes: Conjunctivae and EOM are normal. Pupils are equal, round, and reactive to light. Right eye exhibits no discharge. Left eye exhibits no discharge. No scleral icterus. Neck: Normal range of motion. Neck supple. No tracheal deviation present. No thyromegaly present. Cardiovascular: Normal rate, regular rhythm and normal heart sounds. Exam reveals no gallop and no friction rub. No murmur heard. Pulmonary/Chest: Effort normal and breath sounds normal. No stridor. No respiratory distress. She has no wheezes. She has no rales. She exhibits no tenderness. Abdominal: Soft.  Bowel sounds are normal. She side effects of prescribed medications. All patient questions answered. Pt voiced understanding. Reviewed health maintenance. Instructed to continue current medications, diet and exercise. Patient agreed with treatment plan. Follow up as directed.      Electronically signed by Lelia Edmonds PA-C on 3/6/2018 at 1:52 PM

## 2018-03-07 ENCOUNTER — OFFICE VISIT (OUTPATIENT)
Dept: OBGYN CLINIC | Age: 23
End: 2018-03-07
Payer: MEDICARE

## 2018-03-07 ENCOUNTER — HOSPITAL ENCOUNTER (OUTPATIENT)
Age: 23
Setting detail: SPECIMEN
Discharge: HOME | End: 2018-03-07
Payer: MEDICARE

## 2018-03-07 VITALS
WEIGHT: 176 LBS | DIASTOLIC BLOOD PRESSURE: 80 MMHG | SYSTOLIC BLOOD PRESSURE: 123 MMHG | OXYGEN SATURATION: 96 % | BODY MASS INDEX: 31.18 KG/M2 | RESPIRATION RATE: 12 BRPM | HEIGHT: 63 IN | HEART RATE: 70 BPM

## 2018-03-07 DIAGNOSIS — Z87.59 PREVIOUS MISCARRIAGE WITHOUT PREGNANCY: ICD-10-CM

## 2018-03-07 DIAGNOSIS — Z87.59 PREVIOUS MISCARRIAGE WITHOUT PREGNANCY: Primary | ICD-10-CM

## 2018-03-07 LAB
DIRECT EXAM: NORMAL
Lab: NORMAL
SPECIMEN DESCRIPTION: NORMAL
STATUS: NORMAL

## 2018-03-07 PROCEDURE — 1036F TOBACCO NON-USER: CPT | Performed by: NURSE PRACTITIONER

## 2018-03-07 PROCEDURE — 99213 OFFICE O/P EST LOW 20 MIN: CPT | Performed by: NURSE PRACTITIONER

## 2018-03-07 PROCEDURE — G8417 CALC BMI ABV UP PARAM F/U: HCPCS | Performed by: NURSE PRACTITIONER

## 2018-03-07 PROCEDURE — G8427 DOCREV CUR MEDS BY ELIG CLIN: HCPCS | Performed by: NURSE PRACTITIONER

## 2018-03-07 PROCEDURE — G8484 FLU IMMUNIZE NO ADMIN: HCPCS | Performed by: NURSE PRACTITIONER

## 2018-03-07 ASSESSMENT — ENCOUNTER SYMPTOMS
RESPIRATORY NEGATIVE: 1
EYES NEGATIVE: 1
GASTROINTESTINAL NEGATIVE: 1
ALLERGIC/IMMUNOLOGIC NEGATIVE: 1

## 2018-03-08 LAB
C TRACH DNA GENITAL QL NAA+PROBE: NEGATIVE
N. GONORRHOEAE DNA: NEGATIVE

## 2018-03-23 DIAGNOSIS — G89.29 CHRONIC LEFT-SIDED LOW BACK PAIN WITH SCIATICA, SCIATICA LATERALITY UNSPECIFIED: Primary | ICD-10-CM

## 2018-03-23 DIAGNOSIS — M54.40 CHRONIC LEFT-SIDED LOW BACK PAIN WITH SCIATICA, SCIATICA LATERALITY UNSPECIFIED: Primary | ICD-10-CM

## 2018-04-20 ENCOUNTER — OFFICE VISIT (OUTPATIENT)
Dept: FAMILY MEDICINE CLINIC | Age: 23
End: 2018-04-20
Payer: MEDICARE

## 2018-04-20 ENCOUNTER — HOSPITAL ENCOUNTER (OUTPATIENT)
Age: 23
Setting detail: SPECIMEN
Discharge: HOME | End: 2018-04-20
Payer: MEDICARE

## 2018-04-20 VITALS
HEART RATE: 102 BPM | DIASTOLIC BLOOD PRESSURE: 79 MMHG | BODY MASS INDEX: 33.13 KG/M2 | WEIGHT: 180 LBS | OXYGEN SATURATION: 99 % | TEMPERATURE: 97.6 F | SYSTOLIC BLOOD PRESSURE: 122 MMHG | HEIGHT: 62 IN

## 2018-04-20 DIAGNOSIS — M54.41 CHRONIC BILATERAL LOW BACK PAIN WITH BILATERAL SCIATICA: Primary | ICD-10-CM

## 2018-04-20 DIAGNOSIS — G89.29 CHRONIC BILATERAL LOW BACK PAIN WITH BILATERAL SCIATICA: Primary | ICD-10-CM

## 2018-04-20 DIAGNOSIS — M54.6 ACUTE MIDLINE THORACIC BACK PAIN: ICD-10-CM

## 2018-04-20 DIAGNOSIS — M53.3 COCCYGEAL PAIN, CHRONIC: ICD-10-CM

## 2018-04-20 DIAGNOSIS — M54.42 CHRONIC BILATERAL LOW BACK PAIN WITH BILATERAL SCIATICA: Primary | ICD-10-CM

## 2018-04-20 DIAGNOSIS — G89.29 COCCYGEAL PAIN, CHRONIC: ICD-10-CM

## 2018-04-20 DIAGNOSIS — E55.9 VITAMIN D DEFICIENCY: ICD-10-CM

## 2018-04-20 LAB — VITAMIN D 25-HYDROXY: 41.3 NG/ML (ref 30–100)

## 2018-04-20 PROCEDURE — 99213 OFFICE O/P EST LOW 20 MIN: CPT | Performed by: PHYSICIAN ASSISTANT

## 2018-04-20 PROCEDURE — G8417 CALC BMI ABV UP PARAM F/U: HCPCS | Performed by: PHYSICIAN ASSISTANT

## 2018-04-20 PROCEDURE — 1036F TOBACCO NON-USER: CPT | Performed by: PHYSICIAN ASSISTANT

## 2018-04-20 PROCEDURE — G8427 DOCREV CUR MEDS BY ELIG CLIN: HCPCS | Performed by: PHYSICIAN ASSISTANT

## 2018-04-20 RX ORDER — FAMOTIDINE 20 MG/1
20 TABLET, FILM COATED ORAL DAILY
Qty: 30 TABLET | Refills: 0 | Status: SHIPPED | OUTPATIENT
Start: 2018-04-20 | End: 2018-07-02 | Stop reason: ALTCHOICE

## 2018-04-20 RX ORDER — PREDNISONE 20 MG/1
TABLET ORAL
Qty: 18 TABLET | Refills: 0 | Status: SHIPPED | OUTPATIENT
Start: 2018-04-20 | End: 2018-04-30

## 2018-04-20 RX ORDER — TIZANIDINE 4 MG/1
2 TABLET ORAL 3 TIMES DAILY
Qty: 30 TABLET | Refills: 1 | Status: SHIPPED | OUTPATIENT
Start: 2018-04-20 | End: 2018-07-02 | Stop reason: ALTCHOICE

## 2018-04-25 ASSESSMENT — ENCOUNTER SYMPTOMS
BACK PAIN: 1
DIARRHEA: 0
BOWEL INCONTINENCE: 0
VOMITING: 0
COUGH: 0
RHINORRHEA: 0
CHEST TIGHTNESS: 0
NAUSEA: 0
SHORTNESS OF BREATH: 0
ABDOMINAL PAIN: 0
EYE DISCHARGE: 0
SORE THROAT: 0
SINUS PRESSURE: 0
EYE ITCHING: 0

## 2018-05-01 ENCOUNTER — HOSPITAL ENCOUNTER (OUTPATIENT)
Dept: PHYSICAL THERAPY | Facility: CLINIC | Age: 23
Setting detail: THERAPIES SERIES
Discharge: HOME | End: 2018-05-01
Payer: MEDICARE

## 2018-05-01 PROCEDURE — 97110 THERAPEUTIC EXERCISES: CPT

## 2018-05-01 PROCEDURE — 97162 PT EVAL MOD COMPLEX 30 MIN: CPT

## 2018-05-03 ENCOUNTER — HOSPITAL ENCOUNTER (OUTPATIENT)
Dept: PHYSICAL THERAPY | Facility: CLINIC | Age: 23
Setting detail: THERAPIES SERIES
Discharge: HOME | End: 2018-05-03
Payer: MEDICARE

## 2018-05-07 ENCOUNTER — HOSPITAL ENCOUNTER (OUTPATIENT)
Dept: PHYSICAL THERAPY | Facility: CLINIC | Age: 23
Setting detail: THERAPIES SERIES
Discharge: HOME | End: 2018-05-07
Payer: MEDICARE

## 2018-05-07 PROCEDURE — 97110 THERAPEUTIC EXERCISES: CPT

## 2018-05-07 PROCEDURE — 97112 NEUROMUSCULAR REEDUCATION: CPT

## 2018-05-08 ENCOUNTER — TELEPHONE (OUTPATIENT)
Dept: FAMILY MEDICINE CLINIC | Age: 23
End: 2018-05-08

## 2018-05-10 ENCOUNTER — HOSPITAL ENCOUNTER (OUTPATIENT)
Dept: PHYSICAL THERAPY | Facility: CLINIC | Age: 23
Setting detail: THERAPIES SERIES
Discharge: HOME | End: 2018-05-10
Payer: MEDICARE

## 2018-05-14 ENCOUNTER — HOSPITAL ENCOUNTER (OUTPATIENT)
Dept: PHYSICAL THERAPY | Facility: CLINIC | Age: 23
Setting detail: THERAPIES SERIES
Discharge: HOME | End: 2018-05-14
Payer: MEDICARE

## 2018-05-14 PROCEDURE — 97112 NEUROMUSCULAR REEDUCATION: CPT

## 2018-05-14 PROCEDURE — 97110 THERAPEUTIC EXERCISES: CPT

## 2018-05-17 ENCOUNTER — HOSPITAL ENCOUNTER (OUTPATIENT)
Dept: PHYSICAL THERAPY | Facility: CLINIC | Age: 23
Setting detail: THERAPIES SERIES
Discharge: HOME | End: 2018-05-17
Payer: MEDICARE

## 2018-05-21 ENCOUNTER — HOSPITAL ENCOUNTER (OUTPATIENT)
Dept: PHYSICAL THERAPY | Facility: CLINIC | Age: 23
Setting detail: THERAPIES SERIES
Discharge: HOME | End: 2018-05-21
Payer: MEDICARE

## 2018-05-21 PROCEDURE — 97110 THERAPEUTIC EXERCISES: CPT

## 2018-05-21 PROCEDURE — 97140 MANUAL THERAPY 1/> REGIONS: CPT

## 2018-05-24 ENCOUNTER — HOSPITAL ENCOUNTER (OUTPATIENT)
Dept: PHYSICAL THERAPY | Facility: CLINIC | Age: 23
Setting detail: THERAPIES SERIES
Discharge: HOME | End: 2018-05-24
Payer: MEDICARE

## 2018-05-24 PROCEDURE — 97110 THERAPEUTIC EXERCISES: CPT

## 2018-05-24 PROCEDURE — 97140 MANUAL THERAPY 1/> REGIONS: CPT

## 2018-05-28 ENCOUNTER — APPOINTMENT (OUTPATIENT)
Dept: PHYSICAL THERAPY | Facility: CLINIC | Age: 23
End: 2018-05-28
Payer: MEDICARE

## 2018-05-30 ENCOUNTER — HOSPITAL ENCOUNTER (OUTPATIENT)
Dept: MRI IMAGING | Age: 23
Discharge: HOME | End: 2018-06-01
Payer: MEDICARE

## 2018-05-30 DIAGNOSIS — M54.41 CHRONIC BILATERAL LOW BACK PAIN WITH BILATERAL SCIATICA: ICD-10-CM

## 2018-05-30 DIAGNOSIS — G89.29 CHRONIC BILATERAL LOW BACK PAIN WITH BILATERAL SCIATICA: ICD-10-CM

## 2018-05-30 DIAGNOSIS — M54.42 CHRONIC BILATERAL LOW BACK PAIN WITH BILATERAL SCIATICA: ICD-10-CM

## 2018-05-30 PROCEDURE — 72148 MRI LUMBAR SPINE W/O DYE: CPT

## 2018-05-31 ENCOUNTER — HOSPITAL ENCOUNTER (OUTPATIENT)
Dept: PHYSICAL THERAPY | Facility: CLINIC | Age: 23
Setting detail: THERAPIES SERIES
Discharge: HOME | End: 2018-05-31
Payer: MEDICARE

## 2018-05-31 NOTE — FLOWSHEET NOTE
[] Sonu Rosen        Outpatient Physical                Therapy       [address]       Phone: [phone]       Fax: [phone] [x] [facility] at [address] [facility]       Phone: [phone]       Fax: [phone] [] Niya.  [address] [place] [address]      Phone: [phone]      Fax:  [phone]     Physical Therapy Cancel/No Show note    Date: 2018  Patient: Tutu Larios  : 1995  MRN: [id number]    Cancels/No Shows to date:     For today's appointment patient:  [x]  Cancelled  []  Rescheduled appointment  []  No-show     Reason given by patient:  []  Patient ill  []  Conflicting appointment  [x]  No transportation    []  Conflict with work  []  No reason given  []  Weather related  []  Other:     Comments:  rescheduled  Electronically signed by: Tian Arrieta, PT

## 2018-06-05 ENCOUNTER — HOSPITAL ENCOUNTER (OUTPATIENT)
Dept: PHYSICAL THERAPY | Facility: CLINIC | Age: 23
Setting detail: THERAPIES SERIES
Discharge: HOME | End: 2018-06-05
Payer: MEDICARE

## 2018-06-05 PROCEDURE — 97110 THERAPEUTIC EXERCISES: CPT

## 2018-06-05 PROCEDURE — 97140 MANUAL THERAPY 1/> REGIONS: CPT

## 2018-06-07 ENCOUNTER — HOSPITAL ENCOUNTER (OUTPATIENT)
Dept: PHYSICAL THERAPY | Facility: CLINIC | Age: 23
Setting detail: THERAPIES SERIES
Discharge: HOME | End: 2018-06-07
Payer: MEDICARE

## 2018-06-07 PROCEDURE — 97012 MECHANICAL TRACTION THERAPY: CPT

## 2018-06-07 PROCEDURE — 97110 THERAPEUTIC EXERCISES: CPT

## 2018-06-12 ENCOUNTER — TELEPHONE (OUTPATIENT)
Dept: FAMILY MEDICINE CLINIC | Age: 23
End: 2018-06-12

## 2018-06-14 ENCOUNTER — HOSPITAL ENCOUNTER (OUTPATIENT)
Dept: PHYSICAL THERAPY | Facility: CLINIC | Age: 23
Setting detail: THERAPIES SERIES
Discharge: HOME | End: 2018-06-14
Payer: MEDICARE

## 2018-06-18 ENCOUNTER — HOSPITAL ENCOUNTER (OUTPATIENT)
Dept: PHYSICAL THERAPY | Facility: CLINIC | Age: 23
Setting detail: THERAPIES SERIES
Discharge: HOME | End: 2018-06-18
Payer: MEDICARE

## 2018-06-18 PROCEDURE — 97012 MECHANICAL TRACTION THERAPY: CPT

## 2018-06-18 PROCEDURE — 97140 MANUAL THERAPY 1/> REGIONS: CPT

## 2018-06-18 PROCEDURE — 97110 THERAPEUTIC EXERCISES: CPT

## 2018-06-25 ENCOUNTER — HOSPITAL ENCOUNTER (OUTPATIENT)
Dept: PHYSICAL THERAPY | Facility: CLINIC | Age: 23
Setting detail: THERAPIES SERIES
Discharge: HOME | End: 2018-06-25
Payer: MEDICARE

## 2018-06-25 PROCEDURE — 97140 MANUAL THERAPY 1/> REGIONS: CPT

## 2018-06-25 PROCEDURE — 97110 THERAPEUTIC EXERCISES: CPT

## 2018-06-28 ENCOUNTER — HOSPITAL ENCOUNTER (OUTPATIENT)
Dept: PHYSICAL THERAPY | Facility: CLINIC | Age: 23
Setting detail: THERAPIES SERIES
Discharge: HOME | End: 2018-06-28
Payer: MEDICARE

## 2018-07-02 ENCOUNTER — OFFICE VISIT (OUTPATIENT)
Dept: FAMILY MEDICINE CLINIC | Age: 23
End: 2018-07-02
Payer: MEDICARE

## 2018-07-02 ENCOUNTER — HOSPITAL ENCOUNTER (OUTPATIENT)
Dept: PHYSICAL THERAPY | Facility: CLINIC | Age: 23
Setting detail: THERAPIES SERIES
Discharge: HOME | End: 2018-07-02
Payer: MEDICARE

## 2018-07-02 VITALS
HEART RATE: 77 BPM | HEIGHT: 63 IN | OXYGEN SATURATION: 98 % | SYSTOLIC BLOOD PRESSURE: 119 MMHG | BODY MASS INDEX: 30.48 KG/M2 | WEIGHT: 172 LBS | TEMPERATURE: 98.1 F | DIASTOLIC BLOOD PRESSURE: 76 MMHG

## 2018-07-02 DIAGNOSIS — M53.3 COCCYX PAIN: ICD-10-CM

## 2018-07-02 DIAGNOSIS — H92.01 RIGHT EAR PAIN: ICD-10-CM

## 2018-07-02 DIAGNOSIS — G89.29 CHRONIC LOW BACK PAIN, UNSPECIFIED BACK PAIN LATERALITY, WITH SCIATICA PRESENCE UNSPECIFIED: Primary | ICD-10-CM

## 2018-07-02 DIAGNOSIS — M54.5 CHRONIC LOW BACK PAIN, UNSPECIFIED BACK PAIN LATERALITY, WITH SCIATICA PRESENCE UNSPECIFIED: Primary | ICD-10-CM

## 2018-07-02 PROCEDURE — G8427 DOCREV CUR MEDS BY ELIG CLIN: HCPCS | Performed by: PHYSICIAN ASSISTANT

## 2018-07-02 PROCEDURE — 97112 NEUROMUSCULAR REEDUCATION: CPT

## 2018-07-02 PROCEDURE — 97140 MANUAL THERAPY 1/> REGIONS: CPT

## 2018-07-02 PROCEDURE — 97110 THERAPEUTIC EXERCISES: CPT

## 2018-07-02 PROCEDURE — 1036F TOBACCO NON-USER: CPT | Performed by: PHYSICIAN ASSISTANT

## 2018-07-02 PROCEDURE — 99213 OFFICE O/P EST LOW 20 MIN: CPT | Performed by: PHYSICIAN ASSISTANT

## 2018-07-02 PROCEDURE — G8417 CALC BMI ABV UP PARAM F/U: HCPCS | Performed by: PHYSICIAN ASSISTANT

## 2018-07-02 RX ORDER — LORATADINE 10 MG/1
10 TABLET ORAL DAILY
Qty: 90 TABLET | Refills: 1 | Status: SHIPPED | OUTPATIENT
Start: 2018-07-02 | End: 2018-11-19

## 2018-07-02 RX ORDER — CHOLECALCIFEROL (VITAMIN D3) 1250 MCG
50000 CAPSULE ORAL WEEKLY
Qty: 12 CAPSULE | Refills: 0 | Status: SHIPPED | OUTPATIENT
Start: 2018-07-02 | End: 2018-11-19

## 2018-07-02 ASSESSMENT — ENCOUNTER SYMPTOMS
COUGH: 0
BOWEL INCONTINENCE: 0
NAUSEA: 0
EYE ITCHING: 0
BACK PAIN: 1
CHEST TIGHTNESS: 0
ABDOMINAL PAIN: 0
DIARRHEA: 0
SINUS PRESSURE: 0
SHORTNESS OF BREATH: 0
VOMITING: 0
RHINORRHEA: 0
EYE DISCHARGE: 0
SORE THROAT: 0

## 2018-07-02 NOTE — FLOWSHEET NOTE
[] Vy Castro       Outpatient Physical        Therapy       [address]       Phone: [phone]       Fax: [phone] [x] [facility] at [address] [place] [address]       Phone: [phone]       Fax: [phone] [] [place]  [address] [facility]   Phone: [phone]   Fax:  [phone]     Physical Therapy Daily Treatment Note    Date:  2018  Patient Name:  Niclo Otero    :  1995  MRN: [id number]  Physician: Charlene Barba PA-C                                 Insurance: Ernestene Fortune ADVANTAGE (30 vs)   Medical Diagnosis:   M54.42, M54.41, G89.29 (ICD-10-CM) - Chronic bilateral low back pain with bilateral sciatica   M54.6 (ICD-10-CM) - Acute midline thoracic back pain   M53.3, G89.29 (ICD-10-CM) - Coccygeal pain, chronic   Rehab Codes: M54.5, M54.17, R29.3, M62.830, M53.3   Onset Date: 2017              Next 's appt. : tbd    Visit# / total visits: 10/16 Cancels/No Shows: 5/0    Subjective:    Pain:  [x] Yes  [] No Location: low back, tailbone (coccyx) Pain Rating: (0-10 scale)3/10 (tailbone only) 3/10 (left buttocks into left leg-stops at knee) 1/10 (low back)  Pain altered Tx:  [x] No  [] Yes  Action:  Comments:    \"pulsing- heart beat; denies color changes or feeling cold\" - intermittent   Steady pain for the past 1 week           Objective:  Modalities:2 CP on low back/sacrum x 15 min with bilateral legs elevated on wedge -  HP to mid thoracic/lumbar spine due to pain today 10 min   Traction x 15 min Max 80#, 40# min with 60s on/20s off - not today (helped with low back but not radicular sx/sx down the left leg)  Precautions:  Exercises: limited ex due to reassessment   Exercise Reps/ Time Weight/ Level Comments   Chemo  6 min  L1  Added     Standing       Lat dorsi T-band  30x  Green TB  Added     Monster/lateral walks  35 ft x 2 ea  Green TB at ankles  Added appears elevated on R   Bilateral adductor foam roll/roller x 15 min (butterfly position)         Other:  Neuro re-ed: (equalized pelvic obliquity) RECHECKED 7/2   Nia taping for coccyx (hard copy in chart)- extension moment on coccyx - no pre tape was added today will check skin sensitivity next visit (pt denied sensitivity)   R innoniminate muscle energy technique with hip flexion bias ( 3 sets, 5 s hold, 30 s stretch ea set)   R leg distraction x 2 min post ME technique   L innoniminate muscle energy technique with hip extension bias (3 sets, 5 s hold; 30 s stretch ea set)    L leg distraction x 2 min post ME technique   Progress note  STRENGTH  STRENGTH     LEFT RIGHT   HIP FLEX 4 4+   HIPEXT 4 4+   HIP ABD 4 4+            Specific Instructions for next treatment: coccyx/sacrum MET, piriformis strengthening specific, review of tennis ball or lacrosse ball release, activity modification education as needed --- deep tissue to sacrococcygeal muscle groups/ add pubic symphysis release via shot gun technique.        Treatment Charges: Mins Units   [x]  Modalities- Cp  15  0   [x]  Ther Exercise 20 1   [x]  Manual Therapy 15 1   []  Ther Activities     []  Aquatics     []  Vasocompression     [x]  Other: neuro re ed  10  1    Total Treatment time 45 3       Assessment: [] Progressing toward goal   [] No change. [x] Other: Pt had significant pain with deep tissue technique to Left sacrum and L coccyx. Pt reports low back pain has dissipated greatly. Pt is progressing very slowly. Will trial further manual treatment to reduce L buttocks pain and coccydynia. Problems:    [x] ? Back + SIJ + coccyx Pain: 4/10 (now), 10/10 (worst)   Type of Pain: \"sharp, stabbing, aching, feels like shocks in my back\"                        [x] ? ROM: painful R rotation and L side bending                   [x] ? Strength:     decreased core/BLE strength  [x] ?  Function: 62% perceived deficit   [x] Postural Deviations: anterior Prior HEP/Ed-  Method of Education: [x] Verbal- REVIEWED: performs stretches, ex at least every other day; use of 2 in or so stool for legs to reduce low back pain; sciatic nerve pathology+ piriformis; aggravation of symptoms + pain mechanism; avoid biking - walk instead if unable to do swimming, running, or elliptical; activity tolerance    [x] Demo- REVIEWED: use of tennis ball/lacross ball for piriformis release for L buttocks in seated chair with two arm rests    [] Written  Comprehension of Education:  [x] Verbalizes understanding. [] Demonstrates understanding. [] Needs review. [x] Demonstrates/verbalizes HEP/Ed previously given. Plan: [x] Continue per plan of care.    [] Other:      Time In:3:15 p            Time Out: 4:30 p   Electronically signed by:  Gloria Moulton PT

## 2018-07-02 NOTE — PROGRESS NOTES
Cholecalciferol (VITAMIN D3) 29199 units CAPS Take 50,000 Units by mouth once a week 12 capsule 0     No current facility-administered medications for this visit. No Known Allergies    Health Maintenance   Topic Date Due    Cervical cancer screen  03/14/2014    DTaP/Tdap/Td vaccine (1 - Tdap) 07/02/2022 (Originally 3/14/2012)    Flu vaccine (1) 09/01/2018    HIV screen  Completed       Subjective:      Review of Systems   Constitutional: Negative for chills, diaphoresis, fatigue and fever. HENT: Positive for ear pain. Negative for congestion, ear discharge, hearing loss, postnasal drip, rhinorrhea, sinus pressure and sore throat. Eyes: Negative for discharge and itching. Respiratory: Negative for cough, chest tightness and shortness of breath. Cardiovascular: Negative for chest pain, palpitations and leg swelling. Gastrointestinal: Negative for abdominal pain, bowel incontinence, diarrhea, nausea and vomiting. Genitourinary: Negative for bladder incontinence, dysuria, frequency and pelvic pain. Musculoskeletal: Positive for back pain. Negative for neck pain and neck stiffness. Skin: Negative for rash. Neurological: Negative for dizziness, tingling, weakness, light-headedness, numbness, headaches and paresthesias. All other systems reviewed and are negative. Objective:     Physical Exam   Constitutional: She is oriented to person, place, and time. She appears well-developed and well-nourished. No distress. /74   Pulse 84   Temp 97.8 °F (36.6 °C) (Oral)   Ht 5' 3\" (1.6 m)   Wt 163 lb 8 oz (74.2 kg)   LMP 11/28/2017   SpO2 98%   Breastfeeding? No   BMI 28.96 kg/m²      HENT:   Head: Normocephalic and atraumatic. Right Ear: External ear normal.   Left Ear: External ear normal.   Nose: Nose normal.   Mouth/Throat: Oropharynx is clear and moist.   Eyes: Conjunctivae and EOM are normal. Pupils are equal, round, and reactive to light. Right eye exhibits no discharge. Left eye exhibits no discharge. No scleral icterus. Neck: Normal range of motion. Neck supple. No tracheal deviation present. No thyromegaly present. Cardiovascular: Normal rate, regular rhythm and normal heart sounds. Exam reveals no gallop and no friction rub. No murmur heard. Pulmonary/Chest: Effort normal and breath sounds normal. No stridor. No respiratory distress. She has no wheezes. She has no rales. She exhibits no tenderness. Abdominal: Soft. Bowel sounds are normal. She exhibits no distension. There is no tenderness. There is no rebound and no guarding. Musculoskeletal: She exhibits tenderness. She exhibits no edema. Tender over L SI joint    Pain with coccyx palpation   Neurological: She is alert and oriented to person, place, and time. Gait normal.   Skin: Skin is warm and dry. No rash noted. She is not diaphoretic. Psychiatric: She has a normal mood and affect. Her affect is not inappropriate. Nursing note and vitals reviewed. /76   Pulse 77   Temp 98.1 °F (36.7 °C) (Oral)   Ht 5' 3\" (1.6 m)   Wt 172 lb (78 kg)   SpO2 98%   BMI 30.47 kg/m²     Assessment:       Diagnosis Orders   1. Chronic low back pain, unspecified back pain laterality, with sciatica presence unspecified     2. Coccyx pain     3. Right ear pain               Plan:      No Follow-up on file. No orders of the defined types were placed in this encounter. Orders Placed This Encounter   Medications    loratadine (CLARITIN) 10 MG tablet     Sig: Take 1 tablet by mouth daily     Dispense:  90 tablet     Refill:  1    Cholecalciferol (VITAMIN D3) 88223 units CAPS     Sig: Take 50,000 Units by mouth once a week     Dispense:  12 capsule     Refill:  0        Breast mass - 2 mo. Tender and worse during cycle. Imaging ordered. No family hx breast CA. No drainage. Back pain - thoracic and lumbosacral.  Worse with sitting. No injury. Xray neg. Mobic. Pain worse with chiropractor.   Pain with

## 2018-07-05 ENCOUNTER — APPOINTMENT (OUTPATIENT)
Dept: PHYSICAL THERAPY | Facility: CLINIC | Age: 23
End: 2018-07-05
Payer: MEDICARE

## 2018-07-05 ENCOUNTER — HOSPITAL ENCOUNTER (OUTPATIENT)
Dept: PHYSICAL THERAPY | Facility: CLINIC | Age: 23
Setting detail: THERAPIES SERIES
Discharge: HOME | End: 2018-07-05
Payer: MEDICARE

## 2018-07-05 PROCEDURE — 97140 MANUAL THERAPY 1/> REGIONS: CPT

## 2018-07-05 NOTE — PLAN OF CARE
[] Rip Hodges        Outpatient Physical                Therapy       [address]       Phone: [phone]       Fax: [phone] [x] [facility]       Promotion at [address] [facility]       Phone: [phone]       Fax: [phone] [] [facility]    [address] [facility]     Phone: [phone]     Fax:  [phone]     Physical Therapy Plan of Care    Date:  2018  Patient: Brian Buchanan  : 1995  MRN: [id number]  Physician: Aly Barrett PA-C                                 Insurance: [employer] ADVANTAGE (30 vs)   Medical Diagnosis:   M54.42, M54.41, G89.29 (ICD-10-CM) - Chronic bilateral low back pain with bilateral sciatica   M54.6 (ICD-10-CM) - Acute midline thoracic back pain   M53.3, G89.29 (ICD-10-CM) - Coccygeal pain, chronic   Rehab Codes: M54.5, M54.17, R29.3, M62.830, M53.3   Onset Date: 2017              Next 's appt. : tbd     Visit# / total visits: 10/16      Cancels/No Shows: 5/0          Treatment Plan:  [x] Therapeutic Exercise    [x] Modalities:  [] Therapeutic Activity    [] Ultrasound  [] Electrical Stimulation  [] Gait Training     [] Massage       [] Lumbar/Cervical Traction  [x] Neuromuscular Re-education [x] Cold/hotpack [] Iontophoresis: 4 mg/mL  [x] Instruction in HEP             Dexamethasone Sodium  [] Manual Therapy             Phosphate 40-80 mAmin  [] Aquatic Therapy    [x] Other: WOULD LIKE TO ADD DRY NEEDLING FOR L BUTTOCKS/COCCYX      [] Vasocompression/       Game Ready      []  Medication allergies reviewed for use of    Dexamethasone Sodium Phosphate 4mg/ml     with iontophoresis treatments. Pt is not allergic.       Frequency:  1-2 x/week for 6visits (additional visits = 22 total)      Electronically signed by: Nikki Hsu PT        Physician Signature:________________________________Date:__________________  By signing above or cosigning

## 2018-07-05 NOTE — FLOWSHEET NOTE
[] Bravo Clemente       Outpatient Physical        Therapy       [address]       Phone: [phone]       Fax: [phone] [x] [facility] at [address]       Phone: [phone]       Fax: [phone] [] [place] [place] [address]   Phone: [phone]   Fax:  [phone]     Physical Therapy Daily Treatment Note    Date:  2018  Patient Name:  Patty Carbajal    :  1995  MRN: [id number]  Physician: Dahiana Diop PA-C                                 Insurance: Ludie Fail ADVANTAGE (30 vs)   Medical Diagnosis:   M54.42, M54.41, G89.29 (ICD-10-CM) - Chronic bilateral low back pain with bilateral sciatica   M54.6 (ICD-10-CM) - Acute midline thoracic back pain   M53.3, G89.29 (ICD-10-CM) - Coccygeal pain, chronic   Rehab Codes: M54.5, M54.17, R29.3, M62.830, M53.3   Onset Date: 2017              Next 's appt. : tbd    Visit# / total visits:  Cancels/No Shows: 5/0    Subjective:    Pain:  [x] Yes  [] No Location: low back, tailbone (coccyx) Pain Rating: (0-10 scale)3/10 (tailbone only) 1-2/10 (left buttocks into left leg-stops at knee) 1/10 (low back)  Pain altered Tx:  [] No  [x] Yes  Action: Pt asked to perform no exercises due to feeling \"unwell/dizzy today.:   Comments:  Pt reports tail bone feels \"worse\" today as she rode in the car for an hour yesterday or so. She reports improved low back and left leg symptoms. Objective:  Modalities:2 CP on low back/sacrum x 15 min with bilateral legs elevated on wedge -    Precautions:  Exercises: unable to perform exercise per pt request due to pt \"feeling dizzy and unwell. \"   Exercise Reps/ Time Weight/ Level Comments   Octane  10 min  L1  Added    Increased time today     Standing       Lat dorsi T-band  30x  Green TB  Added     Monster/lateral walks  35 ft x 2 ea  Green TB at ankles  Added ; aggravation/pain. MET   b. Pt to demonstrate proper diaphragmatic breathing with TA contraction to allow for optimal core stabilization during functional activities (lifting, walking, reaching, etc). MET   5. Independent with Home Exercise Programs MET   LTG: (to be met in 16 treatments)  1. Pt to score less than 3/10 for low back/SIJ pain for ability yo participate in recreational activity/fitness. 2. Pt to score less than 2/10 for coccyx pain for improved sitting tolerance. 3. Pt to score 5/5 for hip add/abd bilaterally for SI joint stabilization. 4. Pt to exhibit pain free AROM for lumbar spine for improved spinal mobility while performing ADLs. 5. Pt to score 5/5 for bilateral lat dorsi/glue max for force closure of SI joint to be attained for stabilization. 6. Pt to score less than 15 % perceived dysfunction on the Oswestry for improved quality of life. 7. Pt to report greater than 85% reduction in radiating signs and symptoms for improved function     Patient goals: \" to no longer feel back pain and fix my tailbone pain; I'd like to start working out\" Progressing     Pt. Education:  [x] Yes  [] No  [x] Reviewed Prior HEP/Ed-  Method of Education: [x] Verbal-Reviewed all ex above  [] Demo-   [] Written  Comprehension of Education:  [x] Verbalizes understanding. [] Demonstrates understanding. [] Needs review. [x] Demonstrates/verbalizes HEP/Ed previously given. Plan: [x] Continue per plan of care.    [] Other:      Time In:5:00p            Time Out: 5:50 p   Electronically signed by:  Yudelka Handy, PT

## 2018-07-09 ENCOUNTER — HOSPITAL ENCOUNTER (OUTPATIENT)
Dept: PHYSICAL THERAPY | Facility: CLINIC | Age: 23
Setting detail: THERAPIES SERIES
Discharge: HOME | End: 2018-07-09
Payer: MEDICARE

## 2018-07-09 ENCOUNTER — APPOINTMENT (OUTPATIENT)
Dept: PHYSICAL THERAPY | Facility: CLINIC | Age: 23
End: 2018-07-09
Payer: MEDICARE

## 2018-07-09 PROCEDURE — 97140 MANUAL THERAPY 1/> REGIONS: CPT

## 2018-07-09 NOTE — FLOWSHEET NOTE
innoniminate stretch  15s x 3 ea Use of stool  Increase time, decrease reps 6/18    Step ups  20x    ea  12 in  Added 5/14 ; finger touch for balance to avoid excessive lateral lean towards stance leg 6/7     Lateral step ups  15x ea  12 in Added 5/14; increased step height 5/21   Walking lunge with rotation  35 ft x 2  4# ball  ADDED 6/18    Wall lean stretch  15s x 3 ea   Increased time/dec sets 5/24    Walk laps  4x   Used with SI belt- Discontinue 5.14    Squats  10x  Discontinue 5/14    Bosu squats  15x  Added 6/5 use of mirror          Supine             Side lying             Manual  40 min    Post innominate mobs-- grade III for R hip      PAs to central coccyx/L lateral border( lateral glide)-- grade II/III  Deep tissue massage to L coccyx/sacrum (surrounding soft tissue structures)  * with palpation ischial tub appears elevated on R   Bilateral adductor foam roll/roller x 15 min (butterfly position)    Pubic shot gun x 2    DRY NEEDLING    PIRIFORMIS/QL MET BILATERAL X 25 MIN      Other:  Neuro re-ed: (equalized pelvic obliquity) RECHECKED 7/2   Kramer taping for coccyx (hard copy in chart)- extension moment on coccyx - no pre tape was added today will check skin sensitivity next visit (pt denied sensitivity)   R innoniminate muscle energy technique with hip flexion bias ( 3 sets, 5 s hold, 30 s stretch ea set)   R leg distraction x 2 min post ME technique   L innoniminate muscle energy technique with hip extension bias (3 sets, 5 s hold; 30 s stretch ea set)    L leg distraction x 2 min post ME technique    Progress note  STRENGTH  STRENGTH     LEFT RIGHT   HIP FLEX 4 4+   HIPEXT 4 4+   HIP ABD 4 4+            Specific Instructions for next treatment: coccyx/sacrum MET,deep tissue to sacrococcygeal muscle groups/ add pubic symphysis release via shot gun technique; DRY NEEDLING       Treatment Charges: Mins Units   [x]  Modalities- Cp  15  0   []  Ther Exercise     [x]  Manual Therapy 40 3   []  Ther sitting tolerance. 3. Pt to score 5/5 for hip add/abd bilaterally for SI joint stabilization. 4. Pt to exhibit pain free AROM for lumbar spine for improved spinal mobility while performing ADLs. 5. Pt to score 5/5 for bilateral lat dorsi/glue max for force closure of SI joint to be attained for stabilization. 6. Pt to score less than 15 % perceived dysfunction on the Oswestry for improved quality of life. 7. Pt to report greater than 85% reduction in radiating signs and symptoms for improved function     Patient goals: \" to no longer feel back pain and fix my tailbone pain; I'd like to start working out\" Progressing     Pt. Education:  [x] Yes  [] No  [x] Reviewed Prior HEP/Ed-  Method of Education: [x] Verbal-Reviewed all ex above  [] Demo-   [] Written  Comprehension of Education:  [x] Verbalizes understanding. [] Demonstrates understanding. [] Needs review. [x] Demonstrates/verbalizes HEP/Ed previously given. Plan: [x] Continue per plan of care.    [] Other:      Time In:3:15p            Time Out: 4:10 p   Electronically signed by:  Duarte Johnson, PT

## 2018-07-12 ENCOUNTER — APPOINTMENT (OUTPATIENT)
Dept: PHYSICAL THERAPY | Facility: CLINIC | Age: 23
End: 2018-07-12
Payer: MEDICARE

## 2018-07-16 ENCOUNTER — APPOINTMENT (OUTPATIENT)
Dept: PHYSICAL THERAPY | Facility: CLINIC | Age: 23
End: 2018-07-16
Payer: MEDICARE

## 2018-07-17 ENCOUNTER — OFFICE VISIT (OUTPATIENT)
Dept: OBGYN CLINIC | Age: 23
End: 2018-07-17
Payer: MEDICARE

## 2018-07-17 VITALS
SYSTOLIC BLOOD PRESSURE: 106 MMHG | WEIGHT: 171 LBS | HEIGHT: 63 IN | DIASTOLIC BLOOD PRESSURE: 80 MMHG | BODY MASS INDEX: 30.3 KG/M2

## 2018-07-17 DIAGNOSIS — B37.31 YEAST VAGINITIS: ICD-10-CM

## 2018-07-17 DIAGNOSIS — O26.899 ABDOMINAL CRAMPING AFFECTING PREGNANCY: ICD-10-CM

## 2018-07-17 DIAGNOSIS — Z87.440 HISTORY OF RECURRENT UTIS: ICD-10-CM

## 2018-07-17 DIAGNOSIS — N91.2 AMENORRHEA: Primary | ICD-10-CM

## 2018-07-17 DIAGNOSIS — N89.8 VAGINAL IRRITATION: ICD-10-CM

## 2018-07-17 DIAGNOSIS — R10.9 ABDOMINAL CRAMPING AFFECTING PREGNANCY: ICD-10-CM

## 2018-07-17 LAB
CONTROL: PRESENT
PREGNANCY TEST URINE, POC: POSITIVE

## 2018-07-17 PROCEDURE — 81025 URINE PREGNANCY TEST: CPT | Performed by: ADVANCED PRACTICE MIDWIFE

## 2018-07-17 PROCEDURE — 99213 OFFICE O/P EST LOW 20 MIN: CPT | Performed by: ADVANCED PRACTICE MIDWIFE

## 2018-07-17 NOTE — PROGRESS NOTES
SUBJECTIVE:    Chief Complaint   Patient presents with    Amenorrhea     HPI:  Jeniffer Ying is being seen today for missed menses. She reports a positive pregnancy test on 18. This is a planned pregnancy. She is accepting at this time. Patient's last menstrual period was 2018. Sure and definite: Yes     28 day cycle: No    She was on a contraceptive at the time of conception. Estimated weeks gestation today : 4   Tentative THANH: 3/26/19    Relationship with FOB:     Has been experiencing cramps for the past 2 days. They started on  and were bad enough that she had to stop what she was doing and lay down. Denies bleeding during cramping episodes. Cramps occur almost constant, but occasionally they are localized in the LLQ of her abdomen. She is drinking plenty of water each day. She is also experiencing painful intercourse and states that \"it feels swollen down there\" when she wipes after using the restroom  She is prone to UTI's and feels symptoms stating today that lead her to believe a UTI is starting. She is having pain and discomfort after urination. Is having dental work done and needs a Dental note  She goes to PT for tailbone pain and just started needling. Is it ok to continue. Gained weight since miscarriage and has stated working out 3x per week. Interested in how many calories should she consume as she has recently lost weight. OBJECTIVE:    VS as documented in Epic. Mother's ethnicity:    Father's ethnicity:      She is complaining today of:   Pain: No  Cramping: Yes  Bleeding or spotting: No    Nausea: Yes  Vomiting: No    Breast enlargement/tenderness: No  Fatigue: Yes  Frequency of urination: No    Yeast buds noted on wet prep.      Previous high risk obstetrical history: None  OB History    Para Term  AB Living   1             SAB TAB Ectopic Molar Multiple Live Births                    # Outcome Date GA Lbr Ismael/2nd Weight Sex

## 2018-07-18 ENCOUNTER — HOSPITAL ENCOUNTER (OUTPATIENT)
Age: 23
Setting detail: SPECIMEN
Discharge: HOME | End: 2018-07-18
Payer: MEDICARE

## 2018-07-18 DIAGNOSIS — R10.9 ABDOMINAL CRAMPING AFFECTING PREGNANCY: ICD-10-CM

## 2018-07-18 DIAGNOSIS — N89.8 VAGINAL IRRITATION: ICD-10-CM

## 2018-07-18 DIAGNOSIS — Z87.440 HISTORY OF RECURRENT UTIS: ICD-10-CM

## 2018-07-18 DIAGNOSIS — O26.899 ABDOMINAL CRAMPING AFFECTING PREGNANCY: ICD-10-CM

## 2018-07-18 LAB
BILIRUBIN URINE: NEGATIVE
COLOR: YELLOW
COMMENT UA: NORMAL
DIRECT EXAM: NORMAL
GLUCOSE URINE: NEGATIVE
KETONES, URINE: NEGATIVE
LEUKOCYTE ESTERASE, URINE: NEGATIVE
Lab: NORMAL
NITRITE, URINE: NEGATIVE
PH UA: 5.5 (ref 5–8)
PROTEIN UA: NEGATIVE
SPECIFIC GRAVITY UA: 1.01 (ref 1–1.03)
SPECIMEN DESCRIPTION: NORMAL
STATUS: NORMAL
TURBIDITY: CLEAR
URINE HGB: NEGATIVE
UROBILINOGEN, URINE: NORMAL

## 2018-07-19 ENCOUNTER — HOSPITAL ENCOUNTER (OUTPATIENT)
Dept: PHYSICAL THERAPY | Facility: CLINIC | Age: 23
Setting detail: THERAPIES SERIES
Discharge: HOME | End: 2018-07-19
Payer: MEDICARE

## 2018-07-19 LAB
CULTURE: NORMAL
Lab: NORMAL
SPECIMEN DESCRIPTION: NORMAL
STATUS: NORMAL

## 2018-07-19 PROCEDURE — 97140 MANUAL THERAPY 1/> REGIONS: CPT

## 2018-07-19 NOTE — FLOWSHEET NOTE
[] Kassi Menjivar       Outpatient Physical        Therapy       [address]       Phone: [phone]       Fax: [phone] [x] [facility] at [address]       Phone: [phone]       Fax: [phone] [] [place] [place] [address] [employer]  [address] [place] [address]   Phone: [phone]   Fax:  [phone]     Physical Therapy Daily Treatment Note    Date:  2018  Patient Name:  Earline Hill    :  1995  MRN: [id number]  Physician: Natalia Alvarado PA-C                                 Insurance: Marlaine Gonzalez ADVANTAGE (30 vs)   Medical Diagnosis:   M54.42, M54.41, G89.29 (ICD-10-CM) - Chronic bilateral low back pain with bilateral sciatica   M54.6 (ICD-10-CM) - Acute midline thoracic back pain   M53.3, G89.29 (ICD-10-CM) - Coccygeal pain, chronic   Rehab Codes: M54.5, M54.17, R29.3, M62.830, M53.3   Onset Date: 2017              Next 's appt. : tbd    Visit# / total visits:  Cancels/No Shows: 5/0    Subjective:    Pain:  [x] Yes  [] No Location: low back, tailbone (coccyx) Pain Rating: (0-10 scale) 2-3/10 (tailbone only)     Pain altered Tx:  [] No  [x] Yes  Action: Pt asked to perform no exercises due to feeling \"unwell/dizzy today.:   Comments:  Pt reporting no low back pain but coccyx pain remains. Patient found out she is pregnant and has held her exercises for 2 weeks. She plans to be continuing now that her OB has cleared her.     Objective:  Modalities:2 CP on low back/sacrum x 15 min with bilateral legs elevated on wedge    Precautions:  One month pregnant   Exercises: Did not complete this date   Exercise Reps/ Time Weight/ Level Comments   Octane  10 min  L1  Added    Increased time today     Standing       Lat dorsi T-band  30x  Green TB  Added     Monster/lateral walks  35 ft x 2 ea  Green TB at ankles  Added ; increased resistance     Bosu ball lunges  15x ea Added 6/5 - use of mirror   Hip hikes  15x  ea  On bosu ball     Lunge stretch  10 s x 4 ea 12 in step     HS stretch  1 min x 2 ea  12 in step     Post innoniminate stretch  15s x 3 ea Use of stool  Increase time, decrease reps 6/18    Step ups  20x    ea  12 in  Added 5/14 ; finger touch for balance to avoid excessive lateral lean towards stance leg 6/7     Lateral step ups  15x ea  12 in Added 5/14; increased step height 5/21   Walking lunge with rotation  35 ft x 2  4# ball  ADDED 6/18    Wall lean stretch  15s x 3 ea   Increased time/dec sets 5/24    Walk laps  4x   Used with SI belt- Discontinue 5.14    Squats  10x  Discontinue 5/14    Bosu squats  15x  Added 6/5 use of mirror          Supine             Side lying             Manual  32 min    Post innominate mobs-- grade III for R hip      PAs to central coccyx/L lateral border( lateral glide)-- grade II/III  Deep tissue massage to L coccyx/sacrum (surrounding soft tissue structures)  * with palpation ischial tub appears elevated on R   Bilateral adductor foam roll/roller x 15 min (butterfly position)    Pubic shot gun x 2       Dry Needling  x 8' Patient allowed needles to be inserted but then started to have pain a few minutes in. [place] were removed early and this part of the treatment was ended.    Other:  Neuro re-ed: (equalized pelvic obliquity) RECHECKED 7/2   Kramer taping for coccyx (hard copy in chart)- extension moment on coccyx - no pre tape was added today will check skin sensitivity next visit (pt denied sensitivity)   R innoniminate muscle energy technique with hip flexion bias ( 3 sets, 5 s hold, 30 s stretch ea set)   R leg distraction x 2 min post ME technique   L innoniminate muscle energy technique with hip extension bias (3 sets, 5 s hold; 30 s stretch ea set)    L leg distraction x 2 min post ME technique    Progress note  STRENGTH  STRENGTH     LEFT RIGHT   HIP FLEX 4 4+   HIPEXT 4 4+   HIP ABD 4 4+            Specific Instructions for next treatment: coccyx/sacrum MET,deep tissue to sacrococcygeal muscle groups/ add pubic symphysis release via shot gun technique; DRY NEEDLING       Treatment Charges: Mins Units   [x]  Modalities- Cp  15  0   []  Ther Exercise     [x]  Manual Therapy 40 3   []  Ther Activities     []  Aquatics     []  Vasocompression     []  Other: neuro re ed      Total Treatment time 40 3       Assessment: [x] Progressing toward goal: No low back pain this date    [] No change. [x] Other: Pt reported dry needling did not feel good following first try. Lots of soreness the next day. Patient agreeable to continue trial of dry needling but was unable to tolerate the full treatment and this was held. Patient had less trigger points during [address] but still found it beneficial. Patient also reporting she is 1 month pregnant and this was added to precautions. Problems:    [x] ? Back + SIJ + coccyx Pain: 4/10 (now), 10/10 (worst)   Type of Pain: \"sharp, stabbing, aching, feels like shocks in my back\"                        [x] ? ROM: painful R rotation and L side bending                   [x] ? Strength:     decreased core/BLE strength  [x] ? Function: 62% perceived deficit   [x] Postural Deviations: anterior pelvic tilt; unequal pelvic obliquity; slumped posture with weight- bearing through the coccyx                              STG: (to be met in 8 treatments)   1. ? Pain:  a. Pt to score less than 5/10 for low back/SI pain at worse or improved sx/sx. MET   b. Pt to report coccyx pain less than 4/10 at worse for improved quality of life. MET   2. ? ROM:  a. Pt to exhibit less than 5 degrees bilaterally for HS flexibility for improved hip mobility. MET  3. ? Strength:  a. Pt to score 4+/5 and greater for hip extension bilaterally  for improved ambulation. MET (for R only, L is 4)  b. Pt to score greater than 4/5 for abdominal strength to allow for core stabilization. Progressing ( 3+/5 MMT)   4. ? Function:  a.  Pt to demonstrate proper sitting mechanics to reduce pressure on coccyx to reduce aggravation/pain. MET   b. Pt to demonstrate proper diaphragmatic breathing with TA contraction to allow for optimal core stabilization during functional activities (lifting, walking, reaching, etc). MET   5. Independent with Home Exercise Programs MET   LTG: (to be met in 16 treatments)  1. Pt to score less than 3/10 for low back/SIJ pain for ability yo participate in recreational activity/fitness. 2. Pt to score less than 2/10 for coccyx pain for improved sitting tolerance. 3. Pt to score 5/5 for hip add/abd bilaterally for SI joint stabilization. 4. Pt to exhibit pain free AROM for lumbar spine for improved spinal mobility while performing ADLs. 5. Pt to score 5/5 for bilateral lat dorsi/glue max for force closure of SI joint to be attained for stabilization. 6. Pt to score less than 15 % perceived dysfunction on the Oswestry for improved quality of life. 7. Pt to report greater than 85% reduction in radiating signs and symptoms for improved function     Patient goals: \" to no longer feel back pain and fix my tailbone pain; I'd like to start working out\" Progressing     Pt. Education:  [x] Yes  [] No  [x] Reviewed Prior HEP/Ed-  Method of Education: [x] Verbal-Reviewed all ex above  [] Demo-   [] Written  Comprehension of Education:  [x] Verbalizes understanding. [] Demonstrates understanding. [] Needs review. [x] Demonstrates/verbalizes HEP/Ed previously given. Plan: [x] Continue per plan of care.    [] Other       Time In: 4:45 pm          Time Out: 5:49 pm     Electronically signed by:  Sienna Arthur PTA

## 2018-07-20 ENCOUNTER — APPOINTMENT (OUTPATIENT)
Dept: PHYSICAL THERAPY | Facility: CLINIC | Age: 23
End: 2018-07-20
Payer: MEDICARE

## 2018-07-23 ENCOUNTER — HOSPITAL ENCOUNTER (OUTPATIENT)
Age: 23
Discharge: HOME | End: 2018-07-23
Payer: MEDICARE

## 2018-07-23 ENCOUNTER — HOSPITAL ENCOUNTER (OUTPATIENT)
Dept: PHYSICAL THERAPY | Facility: CLINIC | Age: 23
Setting detail: THERAPIES SERIES
Discharge: HOME | End: 2018-07-23
Payer: MEDICARE

## 2018-07-23 PROCEDURE — 97110 THERAPEUTIC EXERCISES: CPT

## 2018-07-23 NOTE — PROGRESS NOTES
[] Vane Conn       Outpatient Physical                Therapy         [address]       Phone: [phone]       Fax: [phone] [x] [facility] at [address]       Phone: [phone]       Fax: [phone] [] [facility]. [address] [place] [address]     [facility]     Phone: [phone]     Fax:  [phone]     Physical Therapy Progress Note    Date: 2018      Patient: Laron Liriano  : 1995  MRN: [id number]    Physician: Greta Ramirez PA-C                                 Insurance: PARAMOUNT ADVANTAGE (30 vs)   Medical Diagnosis:   M54.42, M54.41, G89.29 (ICD-10-CM) - Chronic bilateral low back pain with bilateral sciatica   M54.6 (ICD-10-CM) - Acute midline thoracic back pain   M53.3, G89.29 (ICD-10-CM) - Coccygeal pain, chronic   Rehab Codes: M54.5, M54.17, R29.3, M62.830, M53.3   Onset Date: 2017              Next 's appt. : tbd     Visit# / total visits:       Cancels/No Shows: 5/0      Subjective:    Pain:  [x] Yes  [] No          Location: low back, tailbone (coccyx) Pain Rating: (0-10 scale) 4/10 (tailbone only)      Pain altered Tx:  [x] No  [] Yes  Action:  Comments:  Pt reporting tail bone is the same. No back pain however. Pt reported dry needling was \"very painful\" lat treatment due to feeling an \"intense muscle pulling in the back. Objective:  Progress note  STRENGTH  STRENGTH      LEFT RIGHT   HIP FLEX 5 5   HIPEXT 5 5   HIP ABD 5 5   Hip ADD 5  5        Lat Dorsi  4+ 4+   Abdominals  4          Assessment:   Pt reports that her tail bone remains about the same. Pt reports she has significant improvement in low back and radicular pain and that it is \"non-existent. \" Pt to be referred back to [place] for further evaluation and may benefit from injections to coccyx to reduce pain.  Pt does have significant flexed coccyx with deviation and pain on the

## 2018-07-23 NOTE — FLOWSHEET NOTE
Manual Therapy     []  Ther Activities     []  Aquatics     []  Vasocompression     []  Other: neuro re ed      Total Treatment time 25 2       Assessment: [] Progressing toward goal:    [] No change. [x] Other: Pt reports that her tail bone remains about the same. Pt reports she has significant improvement in low back and radicular pain and that it is \"non-existent. \" Pt to be referred back to MD for further evaluation and may benefit from injections to coccyx to reduce pain. Pt does have significant flexed coccyx with deviation and pain on the left side. Pt may benefit from pelvic floor therapy via internal manual muscle/joint mobilizations to improve position and decrease pain for coccyx, pending approval by pt's OBGYN given that she is 5 weeks pregnant. As of now, pt is on hold and will re-evaluate once MRI and pain management has been seen and will consider beginning pelvic floor therapy if approved. Problems:    [x] ? Back + SIJ + coccyx Pain: 4/10 (now), 10/10 (worst)   Type of Pain: \"sharp, stabbing, aching, feels like shocks in my back\"                        [x] ? ROM: painful R rotation and L side bending                   [x] ? Strength:     decreased core/BLE strength  [x] ? Function: 62% perceived deficit   [x] Postural Deviations: anterior pelvic tilt; unequal pelvic obliquity; slumped posture with weight- bearing through the coccyx                              STG: (to be met in 8 treatments)   1. ? Pain:  a. Pt to score less than 5/10 for low back/SI pain at worse or improved sx/sx. MET   b. Pt to report coccyx pain less than 4/10 at worse for improved quality of life. MET   2. ? ROM:  a. Pt to exhibit less than 5 degrees bilaterally for HS flexibility for improved hip mobility. MET  3. ? Strength:  a. Pt to score 4+/5 and greater for hip extension bilaterally  for improved ambulation. MET (for R only, L is 4)  b.  Pt to score greater than 4/5 for abdominal strength to allow for core

## 2018-07-26 ENCOUNTER — HOSPITAL ENCOUNTER (OUTPATIENT)
Dept: PHYSICAL THERAPY | Facility: CLINIC | Age: 23
Setting detail: THERAPIES SERIES
End: 2018-07-26
Payer: MEDICARE

## 2018-07-26 ENCOUNTER — TELEPHONE (OUTPATIENT)
Dept: OBGYN CLINIC | Age: 23
End: 2018-07-26

## 2018-07-26 ENCOUNTER — HOSPITAL ENCOUNTER (OUTPATIENT)
Facility: CLINIC | Age: 23
Discharge: HOME | End: 2018-07-26
Payer: MEDICARE

## 2018-07-26 DIAGNOSIS — N91.2 AMENORRHEA: ICD-10-CM

## 2018-07-26 PROCEDURE — 36415 COLL VENOUS BLD VENIPUNCTURE: CPT

## 2018-07-26 PROCEDURE — 84702 CHORIONIC GONADOTROPIN TEST: CPT

## 2018-07-27 LAB — HCG QUANTITATIVE: 5911 IU/L

## 2018-07-30 ENCOUNTER — TELEPHONE (OUTPATIENT)
Dept: PEDIATRICS CLINIC | Age: 23
End: 2018-07-30

## 2018-07-31 DIAGNOSIS — N91.2 AMENORRHEA: Primary | ICD-10-CM

## 2018-08-06 ENCOUNTER — TELEPHONE (OUTPATIENT)
Dept: OBGYN CLINIC | Age: 23
End: 2018-08-06

## 2018-08-06 NOTE — TELEPHONE ENCOUNTER
Patient is requesting Vitamin B6 to be sent to pharmacy for nausea. We are also waiting for records to come from [facility] from an emergency room visit last week.

## 2018-08-07 ENCOUNTER — TELEPHONE (OUTPATIENT)
Dept: OBGYN CLINIC | Age: 23
End: 2018-08-07

## 2018-08-07 DIAGNOSIS — O21.9 NAUSEA AND VOMITING OF PREGNANCY, ANTEPARTUM: Primary | ICD-10-CM

## 2018-08-07 RX ORDER — PYRIDOXINE HCL (VITAMIN B6) 25 MG
25 TABLET ORAL 3 TIMES DAILY
Qty: 90 TABLET | Refills: 1 | Status: SHIPPED | OUTPATIENT
Start: 2018-08-07 | End: 2018-11-19

## 2018-08-13 ENCOUNTER — HOSPITAL ENCOUNTER (OUTPATIENT)
Dept: PHYSICAL THERAPY | Facility: CLINIC | Age: 23
Setting detail: THERAPIES SERIES
Discharge: HOME | End: 2018-08-13
Payer: MEDICARE

## 2018-08-13 NOTE — DISCHARGE SUMMARY
[] [facility]        Outpatient Physical                Therapy       [address]       Phone: [phone]       Fax: [phone] [x] [facility] [address] [facility] at [address] [facility]       Phone: [phone]       Fax: [phone] [] [place]. [facility]     [address] [facility]     Phone: [phone]     Fax:  [phone]     Physical Therapy Discharge Note    Date: 2018      Patient: Poncho Kaplan  : 1995  MRN: [id number]    Physician: Altagracia Ponce PA-C                                 Insurance: PARAMOUNT ADVANTAGE (30 vs)   Medical Diagnosis:   M54.42, M54.41, G89.29 (ICD-10-CM) - Chronic bilateral low back pain with bilateral sciatica   M54.6 (ICD-10-CM) - Acute midline thoracic back pain   M53.3, G89.29 (ICD-10-CM) - Coccygeal pain, chronic   Rehab Codes: M54.5, M54.17, R29.3, M62.830, M53.3   Onset Date: 2017              Next 's appt. : tbd     Visit# / total visits:       Cancels/No Shows: 5/0         Discharge Status:   Pt needed referral for pelvic floor PT due to pregnancy but did not contact office to schedule additional PT appt. Pt had plateau'd with therapy for coccyx pain. Pt failed to make additional appointments for therapy. Pt. Is now discharged. Electronically signed by: Afshin Sterling PT    If you have any questions or concerns, please don't hesitate to call.   Thank you for your referral.

## 2018-08-14 ENCOUNTER — ROUTINE PRENATAL (OUTPATIENT)
Dept: OBGYN CLINIC | Age: 23
End: 2018-08-14
Payer: MEDICARE

## 2018-08-14 VITALS
SYSTOLIC BLOOD PRESSURE: 106 MMHG | BODY MASS INDEX: 30.29 KG/M2 | WEIGHT: 171 LBS | HEART RATE: 66 BPM | DIASTOLIC BLOOD PRESSURE: 64 MMHG

## 2018-08-14 DIAGNOSIS — Z3A.08 8 WEEKS GESTATION OF PREGNANCY: ICD-10-CM

## 2018-08-14 DIAGNOSIS — Z34.91 NORMAL PREGNANCY IN FIRST TRIMESTER: Primary | ICD-10-CM

## 2018-08-14 PROCEDURE — 99213 OFFICE O/P EST LOW 20 MIN: CPT | Performed by: ADVANCED PRACTICE MIDWIFE

## 2018-08-14 RX ORDER — DOCUSATE SODIUM 100 MG/1
100 CAPSULE, LIQUID FILLED ORAL DAILY PRN
Qty: 30 CAPSULE | Refills: 8 | Status: SHIPPED | OUTPATIENT
Start: 2018-08-14 | End: 2019-10-04

## 2018-08-14 NOTE — PROGRESS NOTES
INITIAL OBSTETRICAL VISIT EVALUATION:    Subjective:   Jackson Kang is being seen today for her new obstetrical visit. The pregnancy is  a planned pregnancy. She is  accepting at this time. Her last period was Patient's last menstrual period was 2018. This was a sure and definite menses. She was not on OCP at conception. Patient reports nausea, cramping. She denies spotting and pain  Fetal Movement: None. Remains nervous given previous loss. Undecided about genetic testing. Objective:   /64   Pulse 66   Wt 171 lb (77.6 kg)   LMP 2018   Estimated gestational age is  8w0d  Mother's ethnicity: [facility]  Father's ethnicity: [facility]     Assessment:     Pregnancy @ 8 and 0/7 weeks   Patient Active Problem List   Diagnosis    Vitamin D deficiency    Missed     Chronic bilateral low back pain with bilateral sciatica    Amenorrhea    Vaginal irritation    Abdominal cramping affecting pregnancy    History of recurrent UTIs     Plan:   The patient was seen and a full history was reviewed and completed. List was initiated. Role of ultrasound in pregnancy discussed; fetal survey: requests    She was counseled on office policies. She was given the [employer]. She was educated about the anticipated course of prenatal care. The patient was counseled on Toxoplasmosis, HIV, Cystic Fibrosis, and Sickle Cell disease, as appropriate. She verbally consented to HIV testing. The patient was counseled on the risks of tobacco abuse, both maternal and fetal. She was instructed to stop smoking if currently using tobacco. Morbidity, mortality, and cessation programs were reviewed. The risks include but are not limited to increased risks of  labor,  delivery, premature rupture of membranes, intrauterine growth restriction, intrauterine fetal demise and abruptio placenta. Secondary smoke risks were also reviewed.  Increases in

## 2018-08-15 PROBLEM — Z34.91 NORMAL PREGNANCY IN FIRST TRIMESTER: Status: ACTIVE | Noted: 2018-08-15

## 2018-08-15 PROBLEM — Z3A.08 8 WEEKS GESTATION OF PREGNANCY: Status: ACTIVE | Noted: 2018-08-15

## 2018-08-22 ENCOUNTER — HOSPITAL ENCOUNTER (OUTPATIENT)
Dept: ULTRASOUND IMAGING | Age: 23
Discharge: HOME | End: 2018-08-24
Payer: MEDICARE

## 2018-08-22 ENCOUNTER — HOSPITAL ENCOUNTER (OUTPATIENT)
Age: 23
Discharge: HOME | End: 2018-08-22
Payer: MEDICARE

## 2018-08-22 DIAGNOSIS — Z34.91 NORMAL PREGNANCY IN FIRST TRIMESTER: Primary | ICD-10-CM

## 2018-08-22 DIAGNOSIS — Z34.91 NORMAL PREGNANCY IN FIRST TRIMESTER: ICD-10-CM

## 2018-08-22 LAB
ABO/RH: NORMAL
ABSOLUTE EOS #: 0.12 K/UL (ref 0–0.44)
ABSOLUTE IMMATURE GRANULOCYTE: 0.04 K/UL (ref 0–0.3)
ABSOLUTE LYMPH #: 2.21 K/UL (ref 1.1–3.7)
ABSOLUTE MONO #: 0.52 K/UL (ref 0.1–1.2)
ANTIBODY SCREEN: NEGATIVE
BASOPHILS # BLD: 1 % (ref 0–2)
BASOPHILS ABSOLUTE: 0.04 K/UL (ref 0–0.2)
DIFFERENTIAL TYPE: ABNORMAL
EOSINOPHILS RELATIVE PERCENT: 1 % (ref 1–4)
GLUCOSE ADMINISTRATION: NORMAL
GLUCOSE TOLERANCE SCREEN 50G: 114 MG/DL (ref 70–135)
HCT VFR BLD CALC: 37.7 % (ref 36.3–47.1)
HEMOGLOBIN: 12.1 G/DL (ref 11.9–15.1)
HEPATITIS B SURFACE ANTIGEN: NONREACTIVE
HIV AG/AB: NONREACTIVE
IMMATURE GRANULOCYTES: 1 %
LYMPHOCYTES # BLD: 26 % (ref 24–43)
MCH RBC QN AUTO: 28.5 PG (ref 25.2–33.5)
MCHC RBC AUTO-ENTMCNC: 32.1 G/DL (ref 28.4–34.8)
MCV RBC AUTO: 88.9 FL (ref 82.6–102.9)
MONOCYTES # BLD: 6 % (ref 3–12)
NRBC AUTOMATED: 0 PER 100 WBC
PDW BLD-RTO: 12.7 % (ref 11.8–14.4)
PLATELET # BLD: 260 K/UL (ref 138–453)
PLATELET ESTIMATE: ABNORMAL
PMV BLD AUTO: 11.1 FL (ref 8.1–13.5)
RBC # BLD: 4.24 M/UL (ref 3.95–5.11)
RBC # BLD: ABNORMAL 10*6/UL
RUBV IGG SER QL: 140.1 IU/ML
SEG NEUTROPHILS: 65 % (ref 36–65)
SEGMENTED NEUTROPHILS ABSOLUTE COUNT: 5.67 K/UL (ref 1.5–8.1)
T. PALLIDUM, IGG: NONREACTIVE
WBC # BLD: 8.6 K/UL (ref 3.5–11.3)
WBC # BLD: ABNORMAL 10*3/UL

## 2018-08-22 PROCEDURE — 86900 BLOOD TYPING SEROLOGIC ABO: CPT

## 2018-08-22 PROCEDURE — 76801 OB US < 14 WKS SINGLE FETUS: CPT

## 2018-08-22 PROCEDURE — 86780 TREPONEMA PALLIDUM: CPT

## 2018-08-22 PROCEDURE — 86762 RUBELLA ANTIBODY: CPT

## 2018-08-22 PROCEDURE — 87389 HIV-1 AG W/HIV-1&-2 AB AG IA: CPT

## 2018-08-22 PROCEDURE — 85025 COMPLETE CBC W/AUTO DIFF WBC: CPT

## 2018-08-22 PROCEDURE — 86850 RBC ANTIBODY SCREEN: CPT

## 2018-08-22 PROCEDURE — 86901 BLOOD TYPING SEROLOGIC RH(D): CPT

## 2018-08-22 PROCEDURE — 82950 GLUCOSE TEST: CPT

## 2018-08-22 PROCEDURE — 87340 HEPATITIS B SURFACE AG IA: CPT

## 2018-08-22 PROCEDURE — 36415 COLL VENOUS BLD VENIPUNCTURE: CPT

## 2018-09-10 ENCOUNTER — ROUTINE PRENATAL (OUTPATIENT)
Dept: OBGYN CLINIC | Age: 23
End: 2018-09-10
Payer: MEDICARE

## 2018-09-10 VITALS — DIASTOLIC BLOOD PRESSURE: 64 MMHG | WEIGHT: 167 LBS | SYSTOLIC BLOOD PRESSURE: 108 MMHG | BODY MASS INDEX: 30.54 KG/M2

## 2018-09-10 DIAGNOSIS — Z34.91 PRENATAL CARE IN FIRST TRIMESTER: ICD-10-CM

## 2018-09-10 DIAGNOSIS — Z3A.11 11 WEEKS GESTATION OF PREGNANCY: Primary | ICD-10-CM

## 2018-09-10 PROBLEM — N89.8 VAGINAL IRRITATION: Status: RESOLVED | Noted: 2018-07-17 | Resolved: 2018-09-10

## 2018-09-10 PROBLEM — Z3A.08 8 WEEKS GESTATION OF PREGNANCY: Status: RESOLVED | Noted: 2018-08-15 | Resolved: 2018-09-10

## 2018-09-10 PROBLEM — O02.1 MISSED ABORTION: Status: RESOLVED | Noted: 2018-01-25 | Resolved: 2018-09-10

## 2018-09-10 PROCEDURE — 99213 OFFICE O/P EST LOW 20 MIN: CPT | Performed by: ADVANCED PRACTICE MIDWIFE

## 2018-09-17 ENCOUNTER — TELEPHONE (OUTPATIENT)
Dept: OBGYN CLINIC | Age: 23
End: 2018-09-17

## 2018-09-17 NOTE — TELEPHONE ENCOUNTER
Patient states she has had headache times 10 days. She has been taking 500mg of   Tylenol when the headache gets \"unbearable\" Please advise.

## 2018-09-27 ENCOUNTER — TELEPHONE (OUTPATIENT)
Dept: OBGYN CLINIC | Age: 23
End: 2018-09-27

## 2018-09-27 ENCOUNTER — ROUTINE PRENATAL (OUTPATIENT)
Dept: OBGYN CLINIC | Age: 23
End: 2018-09-27
Payer: MEDICARE

## 2018-09-27 ENCOUNTER — HOSPITAL ENCOUNTER (OUTPATIENT)
Age: 23
Setting detail: SPECIMEN
Discharge: HOME | End: 2018-09-27
Payer: MEDICARE

## 2018-09-27 VITALS
SYSTOLIC BLOOD PRESSURE: 115 MMHG | WEIGHT: 169 LBS | DIASTOLIC BLOOD PRESSURE: 73 MMHG | BODY MASS INDEX: 30.91 KG/M2 | HEART RATE: 88 BPM

## 2018-09-27 DIAGNOSIS — N89.8 VAGINAL IRRITATION: ICD-10-CM

## 2018-09-27 DIAGNOSIS — O26.899 ABDOMINAL CRAMPING AFFECTING PREGNANCY: Primary | ICD-10-CM

## 2018-09-27 DIAGNOSIS — R10.9 ABDOMINAL CRAMPING AFFECTING PREGNANCY: Primary | ICD-10-CM

## 2018-09-27 LAB
DIRECT EXAM: NORMAL
Lab: NORMAL
SPECIMEN DESCRIPTION: NORMAL
STATUS: NORMAL

## 2018-09-27 PROCEDURE — 99213 OFFICE O/P EST LOW 20 MIN: CPT | Performed by: ADVANCED PRACTICE MIDWIFE

## 2018-09-27 NOTE — TELEPHONE ENCOUNTER
Reports she is approximately 14 weeks pregnant and has been cramping all day. She denies any spotting or bleeding. She denies any recent intercourse. She gabbie S/S of UTI or vaginits. Does admit to discomfort/worsening with walking/movement. Advised may be ligament pain and does need to rest ligaments. Advised to call for appt tomorrow if cramping persists.  If worsens through night, to present to ED

## 2018-09-27 NOTE — PROGRESS NOTES
Karleyshazia Ennis Debbie  1995  9/27/18    SUBJECTIVE    Dolores H Arthur Temple presents today with a c/o abdominal cramping x 4 days. 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 States feels like period cramps and points to this over suprapubic region and bilateral adnexa. Denies bleeding. Confirms vaginal discharge. States is sometimes white, sometimes tinted yellow. States is drinking at least 24 ounces of water a day. States intercourse 1 time about 4 days ago. Timothy Disla also reports anxiety about miscarriage given previous loss    OBJECTIVE    Patient's last menstrual period was 06/19/2018. Vitals:    09/27/18 1441   BP: 115/73   Pulse: 88     Physical Examination: General appearance - alert, well appearing, and in no distress, oriented to person, place, and time and normal appearing weight  Mental status - alert, oriented to person, place, and time, normal mood, behavior, speech, dress, motor activity, and thought processes  Abdomen - soft, nontender, nondistended, no masses or organomegaly  Pelvic - normal external genitalia, vulva, vagina, cervix, uterus and adnexa    FHTs: 145    ASSESSMENT & PLAN    Patient Active Problem List   Diagnosis    Vitamin D deficiency    Chronic bilateral low back pain with bilateral sciatica    Amenorrhea    Abdominal cramping affecting pregnancy    History of recurrent UTIs    Normal pregnancy in first trimester     1. Abdominal cramping   -- Vaginitis probe, await results   2. Anxiety   -- FHTs appreciated    Over 50% of this 15 minute visit was spent on counseling and education.

## 2018-09-28 LAB
CULTURE: NORMAL
Lab: NORMAL
SPECIMEN DESCRIPTION: NORMAL
STATUS: NORMAL

## 2018-10-02 ENCOUNTER — TELEPHONE (OUTPATIENT)
Dept: OBGYN CLINIC | Age: 23
End: 2018-10-02

## 2018-10-16 ENCOUNTER — ROUTINE PRENATAL (OUTPATIENT)
Dept: OBGYN CLINIC | Age: 23
End: 2018-10-16
Payer: MEDICARE

## 2018-10-16 VITALS
SYSTOLIC BLOOD PRESSURE: 124 MMHG | HEART RATE: 82 BPM | BODY MASS INDEX: 31.44 KG/M2 | WEIGHT: 171.9 LBS | DIASTOLIC BLOOD PRESSURE: 64 MMHG

## 2018-10-16 DIAGNOSIS — Z3A.17 17 WEEKS GESTATION OF PREGNANCY: Primary | ICD-10-CM

## 2018-10-16 PROCEDURE — G8417 CALC BMI ABV UP PARAM F/U: HCPCS | Performed by: ADVANCED PRACTICE MIDWIFE

## 2018-10-16 PROCEDURE — G8484 FLU IMMUNIZE NO ADMIN: HCPCS | Performed by: ADVANCED PRACTICE MIDWIFE

## 2018-10-16 PROCEDURE — G8427 DOCREV CUR MEDS BY ELIG CLIN: HCPCS | Performed by: ADVANCED PRACTICE MIDWIFE

## 2018-10-16 PROCEDURE — 1036F TOBACCO NON-USER: CPT | Performed by: ADVANCED PRACTICE MIDWIFE

## 2018-10-16 PROCEDURE — 99213 OFFICE O/P EST LOW 20 MIN: CPT | Performed by: ADVANCED PRACTICE MIDWIFE

## 2018-11-08 ENCOUNTER — ROUTINE PRENATAL (OUTPATIENT)
Dept: PERINATAL CARE | Age: 23
End: 2018-11-08
Payer: MEDICARE

## 2018-11-08 VITALS
RESPIRATION RATE: 18 BRPM | DIASTOLIC BLOOD PRESSURE: 65 MMHG | SYSTOLIC BLOOD PRESSURE: 114 MMHG | BODY MASS INDEX: 33.13 KG/M2 | TEMPERATURE: 98.2 F | HEART RATE: 85 BPM | WEIGHT: 180 LBS | HEIGHT: 62 IN

## 2018-11-08 DIAGNOSIS — O99.212 OBESITY AFFECTING PREGNANCY IN SECOND TRIMESTER: Primary | ICD-10-CM

## 2018-11-08 DIAGNOSIS — Z3A.20 20 WEEKS GESTATION OF PREGNANCY: ICD-10-CM

## 2018-11-08 DIAGNOSIS — Z36.86 ENCOUNTER FOR SCREENING FOR RISK OF PRE-TERM LABOR: ICD-10-CM

## 2018-11-08 PROCEDURE — 76811 OB US DETAILED SNGL FETUS: CPT | Performed by: OBSTETRICS & GYNECOLOGY

## 2018-11-08 PROCEDURE — 76817 TRANSVAGINAL US OBSTETRIC: CPT | Performed by: OBSTETRICS & GYNECOLOGY

## 2018-11-19 ENCOUNTER — ROUTINE PRENATAL (OUTPATIENT)
Dept: OBGYN CLINIC | Age: 23
End: 2018-11-19
Payer: MEDICARE

## 2018-11-19 VITALS
HEART RATE: 91 BPM | WEIGHT: 178.38 LBS | BODY MASS INDEX: 32.63 KG/M2 | DIASTOLIC BLOOD PRESSURE: 70 MMHG | SYSTOLIC BLOOD PRESSURE: 103 MMHG

## 2018-11-19 DIAGNOSIS — Z34.91 NORMAL PREGNANCY IN FIRST TRIMESTER: Primary | ICD-10-CM

## 2018-11-19 DIAGNOSIS — Z3A.21 21 WEEKS GESTATION OF PREGNANCY: ICD-10-CM

## 2018-11-19 PROBLEM — N91.2 AMENORRHEA: Status: RESOLVED | Noted: 2018-07-17 | Resolved: 2018-11-19

## 2018-11-19 PROCEDURE — 99212 OFFICE O/P EST SF 10 MIN: CPT | Performed by: ADVANCED PRACTICE MIDWIFE

## 2018-11-29 ENCOUNTER — TELEPHONE (OUTPATIENT)
Dept: OBGYN CLINIC | Age: 23
End: 2018-11-29

## 2018-11-30 ENCOUNTER — TELEPHONE (OUTPATIENT)
Dept: OBGYN CLINIC | Age: 23
End: 2018-11-30

## 2019-01-25 NOTE — TELEPHONE ENCOUNTER
Patient is wanting her results from her ER visit from [facility] [Redness] : redness [Itching] : itching [Nasal Discharge] : nasal discharge [Sore Throat] : sore throat [Postnasal Drip] : postnasal drip [Cough] : cough [Fever] : no fever [Chills] : no chills [Fatigue] : no fatigue [Night Sweats] : no night sweats [Discharge] : no discharge [Dryness] : no dryness  [Vision Problems] : no vision problems [Earache] : no earache [Hearing Loss] : no hearing loss [Nosebleed] : no nosebleeds [Chest Pain] : no chest pain [Palpitations] : no palpitations [Lower Ext Edema] : no lower extremity edema [Shortness Of Breath] : no shortness of breath [Wheezing] : no wheezing [Dyspnea on Exertion] : no dyspnea on exertion [Abdominal Pain] : no abdominal pain [Nausea] : no nausea [Vomiting] : no vomiting [Dysuria] : no dysuria [Incontinence] : no incontinence [Hematuria] : no hematuria [Joint Pain] : no joint pain [Joint Stiffness] : no joint stiffness [Muscle Pain] : no muscle pain [Back Pain] : no back pain [Itching] : no itching [Skin Rash] : no skin rash [Headache] : no headache [Dizziness] : no dizziness [Fainting] : no fainting

## 2019-10-04 ENCOUNTER — OFFICE VISIT (OUTPATIENT)
Dept: FAMILY MEDICINE CLINIC | Age: 24
End: 2019-10-04
Payer: MEDICARE

## 2019-10-04 VITALS
WEIGHT: 185 LBS | SYSTOLIC BLOOD PRESSURE: 106 MMHG | TEMPERATURE: 98 F | BODY MASS INDEX: 34.04 KG/M2 | HEIGHT: 62 IN | OXYGEN SATURATION: 98 % | DIASTOLIC BLOOD PRESSURE: 73 MMHG | HEART RATE: 85 BPM

## 2019-10-04 DIAGNOSIS — M53.3 CHRONIC COCCYGEAL PAIN: Primary | ICD-10-CM

## 2019-10-04 DIAGNOSIS — G89.29 CHRONIC COCCYGEAL PAIN: Primary | ICD-10-CM

## 2019-10-04 DIAGNOSIS — M54.50 LUMBAR BACK PAIN: ICD-10-CM

## 2019-10-04 PROCEDURE — 99213 OFFICE O/P EST LOW 20 MIN: CPT | Performed by: PHYSICIAN ASSISTANT

## 2019-10-04 PROCEDURE — G8427 DOCREV CUR MEDS BY ELIG CLIN: HCPCS | Performed by: PHYSICIAN ASSISTANT

## 2019-10-04 PROCEDURE — G8417 CALC BMI ABV UP PARAM F/U: HCPCS | Performed by: PHYSICIAN ASSISTANT

## 2019-10-04 PROCEDURE — 1036F TOBACCO NON-USER: CPT | Performed by: PHYSICIAN ASSISTANT

## 2019-10-04 PROCEDURE — G8484 FLU IMMUNIZE NO ADMIN: HCPCS | Performed by: PHYSICIAN ASSISTANT

## 2019-10-04 ASSESSMENT — PATIENT HEALTH QUESTIONNAIRE - PHQ9
SUM OF ALL RESPONSES TO PHQ QUESTIONS 1-9: 0
SUM OF ALL RESPONSES TO PHQ9 QUESTIONS 1 & 2: 0
SUM OF ALL RESPONSES TO PHQ QUESTIONS 1-9: 0
1. LITTLE INTEREST OR PLEASURE IN DOING THINGS: 0
2. FEELING DOWN, DEPRESSED OR HOPELESS: 0

## 2019-10-04 ASSESSMENT — ENCOUNTER SYMPTOMS
BACK PAIN: 1
SINUS PRESSURE: 0
BOWEL INCONTINENCE: 0
NAUSEA: 0
SHORTNESS OF BREATH: 0
EYE ITCHING: 0
SWOLLEN GLANDS: 0
EYE DISCHARGE: 0
CHEST TIGHTNESS: 0

## 2019-10-21 ASSESSMENT — ENCOUNTER SYMPTOMS: VISUAL CHANGE: 0

## 2020-09-23 ENCOUNTER — OFFICE VISIT (OUTPATIENT)
Dept: PRIMARY CARE CLINIC | Age: 25
End: 2020-09-23
Payer: MEDICARE

## 2020-09-23 VITALS
DIASTOLIC BLOOD PRESSURE: 70 MMHG | HEART RATE: 85 BPM | TEMPERATURE: 98.4 F | SYSTOLIC BLOOD PRESSURE: 112 MMHG | HEIGHT: 62 IN | BODY MASS INDEX: 35.3 KG/M2 | OXYGEN SATURATION: 98 % | RESPIRATION RATE: 16 BRPM | WEIGHT: 191.8 LBS

## 2020-09-23 PROCEDURE — 99385 PREV VISIT NEW AGE 18-39: CPT | Performed by: NURSE PRACTITIONER

## 2020-09-23 RX ORDER — NAPROXEN 500 MG/1
500 TABLET ORAL 2 TIMES DAILY WITH MEALS
Qty: 60 TABLET | Refills: 5 | Status: SHIPPED | OUTPATIENT
Start: 2020-09-23 | End: 2021-12-02 | Stop reason: ALTCHOICE

## 2020-09-23 RX ORDER — PHENTERMINE HYDROCHLORIDE 37.5 MG/1
37.5 CAPSULE ORAL EVERY MORNING
Qty: 30 CAPSULE | Refills: 0 | Status: SHIPPED | OUTPATIENT
Start: 2020-09-23 | End: 2020-10-23

## 2020-09-23 ASSESSMENT — PATIENT HEALTH QUESTIONNAIRE - PHQ9
1. LITTLE INTEREST OR PLEASURE IN DOING THINGS: 0
SUM OF ALL RESPONSES TO PHQ QUESTIONS 1-9: 0
SUM OF ALL RESPONSES TO PHQ QUESTIONS 1-9: 0
SUM OF ALL RESPONSES TO PHQ9 QUESTIONS 1 & 2: 0
2. FEELING DOWN, DEPRESSED OR HOPELESS: 0

## 2020-09-23 ASSESSMENT — ENCOUNTER SYMPTOMS
NAUSEA: 0
SINUS PAIN: 0
WHEEZING: 0
SINUS PRESSURE: 0
BACK PAIN: 1
CHEST TIGHTNESS: 0
VOMITING: 0
ABDOMINAL PAIN: 0
EYE ITCHING: 0
TROUBLE SWALLOWING: 0
SORE THROAT: 0
DIARRHEA: 0
COUGH: 0
SHORTNESS OF BREATH: 0
EYE REDNESS: 0
EYE DISCHARGE: 0
ALLERGIC/IMMUNOLOGIC NEGATIVE: 1

## 2020-09-23 NOTE — PROGRESS NOTES
[address] PRIMARY CARE  [address] [facility] [address]  [address] [address]  Dept: [phone]  Dept Fax: [phone]    Angelica Peterson is a 30 y.o. female who presents today for her medical conditions/complaintsas noted below. Angelica Peterson is c/o of Establish Care (Lower back pain that shoots down legs bilateral) and Other (Discuss weight)        HPI:     Pt presents to establish care. She was a previous pt of [employer] in [facility] stable. Weight is up from last visit. She has a daughter who is 332 years old. She stays at home with her. Eating and drinking with no difficulty. She thinks she May have had a UTI last week but after drinking a lot of water and cranberry juice it went away. Hx of frequent UTI's. No GI complaints. She c/o back pain. It started with problems in her tailbone especially when she sat down. She started to lean on one side to off set the pain. She then started to have low back pain. She went to PT. Back pain went away but tail bone pain never went away. She then had a baby. She is overweight and the pain seems to be worse. The pain is in her SI area and shoots down her leg. It was only on the R but now its on both. She is trying to walk more for exercise for the last 2 weeks. The pain is getting worse d/t pain. The pain is really bad in her upper buttocks, shoots down and fades. She rates her pain a 7-8/10 when it comes. On average its a 3/10. Intermittent. Better with sitting. Pain is worse with standing or walking. She has not tried to take anything recently for the pain. She is not losing weight. She is trying to lose weight. She tries to watch her diet and exercise. When she doesn't see results she binge eats. She has never taken medication for weight loss. Her family is over weight and some of them have had weight loss surgery.  She has been trying to exercise but d/t her back pain its difficult    Since her childbirth, her cycle is now every 35 days and the last one was 40 days. Her ob/gyn is dr Beth Macias and [facility]"" ob/gyn. She is up to date on her paps. She is not on birth control. Its difficult to tell when she is ovulating b/c her discharge varies t/o her cycle now. Other   Pertinent negatives include no abdominal pain, arthralgias, chest pain, chills, coughing, fatigue, fever, headaches, nausea, neck pain, rash, sore throat, vomiting or weakness. History reviewed. No pertinent past medical history. History reviewed. No pertinent surgical history. Family History   Problem Relation Age of Onset    Diabetes Paternal Grandmother     Breast Cancer Neg Hx     Colon Cancer Neg Hx     Eclampsia Neg Hx     Hypertension Neg Hx     Ovarian Cancer Neg Hx      Labor Neg Hx     Spont Abortions Neg Hx     Stroke Neg Hx     Cancer Neg Hx        Social History     Tobacco Use    Smoking status: Never Smoker    Smokeless tobacco: Never Used   Substance Use Topics    Alcohol use: No      Current Outpatient Medications   Medication Sig Dispense Refill    naproxen (NAPROSYN) 500 MG tablet Take 1 tablet by mouth 2 times daily (with meals) 60 tablet 5    phentermine 37.5 MG capsule Take 1 capsule by mouth every morning for 30 days. 30 capsule 0     No current facility-administered medications for this visit.       No Known Allergies    Health Maintenance   Topic Date Due    Varicella vaccine (1 of 2 - 2-dose childhood series) 10/04/2020 (Originally 3/14/1994)    Cervical cancer screen  2021 (Originally 3/14/2014)    Flu vaccine (1) 2021 (Originally 2020)    DTaP/Tdap/Td vaccine (1 - Tdap) 2022 (Originally 3/14/2012)    HIV screen  Completed    Hepatitis A vaccine  Aged Out    Hepatitis B vaccine  Aged Out    Hib vaccine  Aged Out    Meningococcal (ACWY) vaccine  Aged Out    Pneumococcal 0-64 years Vaccine  Aged Out       :     Review of Systems   Constitutional: Negative for place, and time. Psychiatric:         Mood and Affect: Mood normal.       /70   Pulse 85   Temp 98.4 °F (36.9 °C)   Resp 16   Ht 5' 2\" (1.575 m)   Wt 191 lb 12.8 oz (87 kg)   SpO2 98%   Breastfeeding No   BMI 35.08 kg/m²     Assessment:       Diagnosis Orders   1. Annual physical exam  CBC Auto Differential    Comprehensive Metabolic Panel   2. Screening for thyroid disorder  TSH with Reflex   3. Screening for lipid disorders  Lipid Panel   4. Vitamin D deficiency  Vitamin D 25 Hydroxy   5. Class 2 obesity due to excess calories without serious comorbidity with body mass index (BMI) of 35.0 to 35.9 in adult  phentermine 37.5 MG capsule   6. Acute back pain with sciatica, unspecified laterality         :      Return in about 1 month (around 10/23/2020). 1.) annual physical  -rx for lab work    2 and 3.) screening for thyroid and lipid  -rx for lab work    4.) vit d deficiency  -rx for lab work    5.) obesity  -discussed diet and exercise  -rx for adipex. F/u appointment in 28 days. She understands to diet and exercise. Discussed side effects    6.) acute back pain with sciatica  -naproxen. Stretches given to patient.   -discussed weight loss which may help  -she is interested in doing PT again but since she is leaving to go out of the country for 2 months,she wants to wait. F/u in one month to assess weight loss, discuss labs and back pain. Orders Placed This Encounter   Procedures    CBC Auto Differential     Standing Status:   Future     Standing Expiration Date:   9/23/2021    TSH with Reflex     Standing Status:   Future     Standing Expiration Date:   9/23/2021    Lipid Panel     Standing Status:   Future     Standing Expiration Date:   12/23/2020     Order Specific Question:   Is Patient Fasting?/# of Hours     Answer:    Fast 8-10 hours    Comprehensive Metabolic Panel     Standing Status:   Future     Standing Expiration Date:   9/23/2021    Vitamin D 25 Hydroxy     Standing Status:   Future     Standing Expiration Date:   9/23/2021     Orders Placed This Encounter   Medications    naproxen (NAPROSYN) 500 MG tablet     Sig: Take 1 tablet by mouth 2 times daily (with meals)     Dispense:  60 tablet     Refill:  5    phentermine 37.5 MG capsule     Sig: Take 1 capsule by mouth every morning for 30 days. Dispense:  30 capsule     Refill:  0       Patient given educational materials - seepatient instructions. Discussed use, benefit, and side effects of prescribed medications. All patient questions answered. Pt voiced understanding. Reviewed health maintenance. Instructed to continue current medications, diet and exercise. Patient agreedwith treatment plan. Follow up as directed.       Electronically signed by JORY Salmeron CNP on 9/23/2020at 3:36 PM

## 2020-09-23 NOTE — PATIENT INSTRUCTIONS
Patient Education        Gluteal Strain: Rehab Exercises  Introduction  Here are some examples of exercises for you to try. The exercises may be suggested for a condition or for rehabilitation. Start each exercise slowly. Ease off the exercises if you start to have pain. You will be told when to start these exercises and which ones will work best for you. How to do the exercises  Hip rotator stretch   1. Lie on your back with both knees bent and your feet flat on the floor. 2. Put the ankle of your affected leg on your opposite thigh near your knee. 3. Use your hand to gently push the knee of your affected leg away from your body until you feel a gentle stretch around your hip. 4. Hold the stretch for 15 to 30 seconds. 5. Repeat 2 to 4 times. 6. Repeat steps 1 through 5, but this time use your hand to gently pull your knee toward your opposite shoulder. 7. Switch legs and repeat steps 1 through 6, even if only one hip is sore. Seated hip rotator stretch   1. Sit in a sturdy chair. 2. Cross your affected leg over your knee, resting your foot on top of your knee. 3. Keep your back straight, and slowly lean forward until you feel a stretch in your hip. 4. Hold for 15 to 30 seconds. 5. Switch legs and repeat steps 1 through 4 on your other side. 6. Repeat 2 to 4 times. Hamstring stretch (lying down)   1. Lie flat on your back with your legs straight. If you feel discomfort in your back, place a small towel roll under your lower back. 2. Holding the back of your affected leg, lift your leg straight up and toward your body until you feel a stretch at the back of your thigh. 3. Hold the stretch for at least 30 seconds. 4. Repeat 2 to 4 times. 5. Switch legs and repeat steps 1 through 4, even if only one hip is sore. Bridging   1. Lie on your back with both knees bent. Your knees should be bent about 90 degrees.   2. Then push your feet into the floor, squeeze your buttocks, and lift your hips off the floor until your shoulders, hips, and knees are all in a straight line. 3. Hold for about 6 seconds as you continue to breathe normally, and then slowly lower your hips back down to the floor and rest for up to 10 seconds. 4. Repeat 8 to 12 times. Single-leg bridge   1. Lie on your back, with your arms at your sides. 2. Bend one knee, and keep that foot flat on the floor. The other leg should be straight. 3. Raise the straight leg up so that the knee is level with the bent knee. 4. Tighten your belly muscles by pulling your belly button in toward your spine. Lift your buttocks up and be careful not to let your hips drop down. 5. Hold for about 6 seconds as you continue to breathe normally, and then slowly lower your hips back down to the floor. 6. Switch legs and repeat steps 1 though 5.  7. Repeat 8 to 12 times. Follow-up care is a key part of your treatment and safety. Be sure to make and go to all appointments, and call your doctor if you are having problems. It's also a good idea to know your test results and keep a list of the medicines you take. Where can you learn more? Go to [email or web address] and sign in to your [employer] account. Enter N799 in the blogfoster box to learn more about \"Gluteal Strain: Rehab Exercises. \"     If you do not have an account, please click on the \"Sign Up Now\" link. Current as of: March 2, 2020               Content Version: 12.5  © 2006-2020 Healthwise, Incorporated. Care instructions adapted under license by [facility] ([facility]). If you have questions about a medical condition or this instruction, always ask your healthcare professional. [place] [address] any warranty or liability for your use of this information. Patient Education        Learning About Dietary Guidelines  What are the Dietary Guidelines for Americans? Dietary Guidelines for Americans provide tips for eating well and staying healthy.  This account. Enter D008 in the [facility] box to learn more about \"Learning About Dietary Guidelines. \"     If you do not have an account, please click on the \"Sign Up Now\" link. Current as of: August 22, 2019               Content Version: 12.5  © 9615-2300 Healthwise, Incorporated. Care instructions adapted under license by [facility] ([facility]). If you have questions about a medical condition or this instruction, always ask your healthcare professional. [address] any warranty or liability for your use of this information.

## 2020-10-28 ENCOUNTER — HOSPITAL ENCOUNTER (OUTPATIENT)
Age: 25
Setting detail: SPECIMEN
Discharge: HOME | End: 2020-10-28
Payer: MEDICARE

## 2020-10-28 ENCOUNTER — OFFICE VISIT (OUTPATIENT)
Dept: PRIMARY CARE CLINIC | Age: 25
End: 2020-10-28
Payer: MEDICARE

## 2020-10-28 VITALS
DIASTOLIC BLOOD PRESSURE: 82 MMHG | TEMPERATURE: 97.2 F | HEART RATE: 70 BPM | BODY MASS INDEX: 33.73 KG/M2 | SYSTOLIC BLOOD PRESSURE: 112 MMHG | WEIGHT: 184.4 LBS | RESPIRATION RATE: 16 BRPM

## 2020-10-28 PROBLEM — E66.811 CLASS 1 OBESITY DUE TO EXCESS CALORIES WITHOUT SERIOUS COMORBIDITY WITH BODY MASS INDEX (BMI) OF 33.0 TO 33.9 IN ADULT: Status: ACTIVE | Noted: 2020-10-28

## 2020-10-28 PROBLEM — E66.09 CLASS 1 OBESITY DUE TO EXCESS CALORIES WITHOUT SERIOUS COMORBIDITY WITH BODY MASS INDEX (BMI) OF 33.0 TO 33.9 IN ADULT: Status: ACTIVE | Noted: 2020-10-28

## 2020-10-28 LAB
ABSOLUTE EOS #: 0.23 K/UL (ref 0–0.44)
ABSOLUTE IMMATURE GRANULOCYTE: <0.03 K/UL (ref 0–0.3)
ABSOLUTE LYMPH #: 3.08 K/UL (ref 1.1–3.7)
ABSOLUTE MONO #: 0.47 K/UL (ref 0.1–1.2)
ALBUMIN SERPL-MCNC: 4.3 G/DL (ref 3.5–5.2)
ALBUMIN/GLOBULIN RATIO: 1.5 (ref 1–2.5)
ALP BLD-CCNC: 96 U/L (ref 35–104)
ALT SERPL-CCNC: 16 U/L (ref 5–33)
ANION GAP SERPL CALCULATED.3IONS-SCNC: 10 MMOL/L (ref 9–17)
AST SERPL-CCNC: 16 U/L
BASOPHILS # BLD: 0 % (ref 0–2)
BASOPHILS ABSOLUTE: <0.03 K/UL (ref 0–0.2)
BILIRUB SERPL-MCNC: 0.65 MG/DL (ref 0.3–1.2)
BILIRUBIN, POC: NEGATIVE
BLOOD URINE, POC: NORMAL
BUN BLDV-MCNC: 7 MG/DL (ref 6–20)
BUN/CREAT BLD: NORMAL (ref 9–20)
CALCIUM SERPL-MCNC: 9.5 MG/DL (ref 8.6–10.4)
CHLORIDE BLD-SCNC: 105 MMOL/L (ref 98–107)
CHOLESTEROL/HDL RATIO: 3.9
CHOLESTEROL: 181 MG/DL
CLARITY, POC: NORMAL
CO2: 22 MMOL/L (ref 20–31)
COLOR, POC: YELLOW
CREAT SERPL-MCNC: 0.71 MG/DL (ref 0.5–0.9)
DIFFERENTIAL TYPE: NORMAL
EOSINOPHILS RELATIVE PERCENT: 3 % (ref 1–4)
GFR AFRICAN AMERICAN: >60 ML/MIN
GFR NON-AFRICAN AMERICAN: >60 ML/MIN
GFR SERPL CREATININE-BSD FRML MDRD: NORMAL ML/MIN/{1.73_M2}
GFR SERPL CREATININE-BSD FRML MDRD: NORMAL ML/MIN/{1.73_M2}
GLUCOSE BLD-MCNC: 84 MG/DL (ref 70–99)
GLUCOSE URINE, POC: NEGATIVE
HCT VFR BLD CALC: 40 % (ref 36.3–47.1)
HDLC SERPL-MCNC: 46 MG/DL
HEMOGLOBIN: 12.5 G/DL (ref 11.9–15.1)
IMMATURE GRANULOCYTES: 0 %
KETONES, POC: NEGATIVE
LDL CHOLESTEROL: 114 MG/DL (ref 0–130)
LEUKOCYTE EST, POC: NORMAL
LYMPHOCYTES # BLD: 39 % (ref 24–43)
MCH RBC QN AUTO: 27.6 PG (ref 25.2–33.5)
MCHC RBC AUTO-ENTMCNC: 31.3 G/DL (ref 28.4–34.8)
MCV RBC AUTO: 88.3 FL (ref 82.6–102.9)
MONOCYTES # BLD: 6 % (ref 3–12)
NITRITE, POC: NEGATIVE
NRBC AUTOMATED: 0 PER 100 WBC
PDW BLD-RTO: 13.1 % (ref 11.8–14.4)
PH, POC: 6
PLATELET # BLD: 323 K/UL (ref 138–453)
PLATELET ESTIMATE: NORMAL
PMV BLD AUTO: 10.9 FL (ref 8.1–13.5)
POTASSIUM SERPL-SCNC: 4.1 MMOL/L (ref 3.7–5.3)
PROTEIN, POC: NEGATIVE
RBC # BLD: 4.53 M/UL (ref 3.95–5.11)
RBC # BLD: NORMAL 10*6/UL
SEG NEUTROPHILS: 52 % (ref 36–65)
SEGMENTED NEUTROPHILS ABSOLUTE COUNT: 4.08 K/UL (ref 1.5–8.1)
SODIUM BLD-SCNC: 137 MMOL/L (ref 135–144)
SPECIFIC GRAVITY, POC: 1.02
TOTAL PROTEIN: 7.1 G/DL (ref 6.4–8.3)
TRIGL SERPL-MCNC: 105 MG/DL
TSH SERPL DL<=0.05 MIU/L-ACNC: 2.25 MIU/L (ref 0.3–5)
UROBILINOGEN, POC: NORMAL
VITAMIN D 25-HYDROXY: 17.4 NG/ML (ref 30–100)
VLDLC SERPL CALC-MCNC: NORMAL MG/DL (ref 1–30)
WBC # BLD: 7.9 K/UL (ref 3.5–11.3)
WBC # BLD: NORMAL 10*3/UL

## 2020-10-28 PROCEDURE — 81003 URINALYSIS AUTO W/O SCOPE: CPT | Performed by: NURSE PRACTITIONER

## 2020-10-28 PROCEDURE — 99214 OFFICE O/P EST MOD 30 MIN: CPT | Performed by: NURSE PRACTITIONER

## 2020-10-28 RX ORDER — NITROFURANTOIN 25; 75 MG/1; MG/1
100 CAPSULE ORAL 2 TIMES DAILY
Qty: 14 CAPSULE | Refills: 0 | Status: SHIPPED | OUTPATIENT
Start: 2020-10-28 | End: 2020-11-04

## 2020-10-28 RX ORDER — PHENTERMINE HYDROCHLORIDE 37.5 MG/1
37.5 CAPSULE ORAL EVERY MORNING
Qty: 30 CAPSULE | Refills: 0 | Status: SHIPPED | OUTPATIENT
Start: 2020-10-28 | End: 2020-11-24 | Stop reason: SDUPTHER

## 2020-10-28 NOTE — PROGRESS NOTES
[address] PRIMARY CARE  [address] [facility] [address]  [address] [address]  Dept: [phone]  Dept Fax: [phone]    Carolee Garcia is a 30 y.o. female who presents today for her medical conditions/complaintsas noted below. Carolee Garcia is c/o of Medication Check and Weight Management (pt here for a 1 month follow up on weight issues)        HPI:     Pt presents for a one month follow up  bp stable  Weight, down 7 lbs from last visit    Pt presents for a weight check. She was started on adipex at last visit. C/o feeling shaky for a few days. Otherwise denies any other side effects. She has cut back on her portions and she doesn't think about food as much. Drinking more water. She has not been exercising at all yet. She is still having some back pain. She has been sitting a lot d/t a lot of driving which has made her pain worse. Now the pain is more tailbone and lower back. Hard to pin point one area. She gets mild relief with the naproxen. No loss of bowel or bladder. No saddle anesthesia. C/o UTI symptoms on and off for a few weeks. C/o hesitancy, burning and frequency. She has a hard time holding her urine. Hx of frequent UTI's up until her pregnancy. No uti since she had her daughter. No vaginal itching or discharge. No odor. Labs not done yet. She is fasting today. No longer going out of the country. History reviewed. No pertinent past medical history. History reviewed. No pertinent surgical history. Family History   Problem Relation Age of Onset    Diabetes Paternal Grandmother     Breast Cancer Neg Hx     Colon Cancer Neg Hx     Eclampsia Neg Hx     Hypertension Neg Hx     Ovarian Cancer Neg Hx      Labor Neg Hx     Spont Abortions Neg Hx     Stroke Neg Hx     Cancer Neg Hx        Social History     Tobacco Use    Smoking status: Never Smoker    Smokeless tobacco: Never Used   Substance Use Topics    Alcohol use:  No Current Outpatient Medications   Medication Sig Dispense Refill    phentermine (ADIPEX-P) 37.5 MG capsule Take 1 capsule by mouth every morning for 30 days. 30 capsule 0    nitrofurantoin, macrocrystal-monohydrate, (MACROBID) 100 MG capsule Take 1 capsule by mouth 2 times daily for 7 days 14 capsule 0    vitamin D (ERGOCALCIFEROL) 1.25 MG (09036 UT) CAPS capsule Take 1 capsule by mouth once a week 12 capsule 1    naproxen (NAPROSYN) 500 MG tablet Take 1 tablet by mouth 2 times daily (with meals) 60 tablet 5     No current facility-administered medications for this visit. No Known Allergies    Health Maintenance   Topic Date Due    Varicella vaccine (1 of 2 - 2-dose childhood series) 03/14/1994    Cervical cancer screen  09/23/2021 (Originally 3/14/2014)    Flu vaccine (1) 09/23/2021 (Originally 9/1/2020)    DTaP/Tdap/Td vaccine (1 - Tdap) 07/02/2022 (Originally 3/14/2012)    HIV screen  Completed    Hepatitis A vaccine  Aged Out    Hepatitis B vaccine  Aged Out    Hib vaccine  Aged Out    Meningococcal (ACWY) vaccine  Aged Out    Pneumococcal 0-64 years Vaccine  Aged Out       :     Review of Systems   Constitutional: Negative for chills, fatigue and fever. HENT: Negative for ear discharge, ear pain, sinus pressure, sinus pain, sore throat and trouble swallowing. Eyes: Negative for discharge, redness and itching. Respiratory: Negative for cough, chest tightness, shortness of breath and wheezing. Cardiovascular: Negative for chest pain. Gastrointestinal: Negative for abdominal pain, diarrhea, nausea and vomiting. Endocrine: Negative. Genitourinary: Positive for dysuria, frequency and urgency. Negative for difficulty urinating, hematuria, vaginal discharge and vaginal pain. Musculoskeletal: Negative for arthralgias and neck pain. Skin: Negative for rash. Allergic/Immunologic: Negative. Neurological: Negative for dizziness, weakness, light-headedness and headaches. will have the patient stop the antibiotic. 3.) obesity  -lost 7 lbs. She is monitoring her portions. She has not started exercising yet. -rx renewed for another month  4.) chronic back pain  -continues but improved    F/u in one month for weight recheck. Orders Placed This Encounter   Procedures    Culture, Urine     Standing Status:   Future     Number of Occurrences:   1     Standing Expiration Date:   10/28/2021     Order Specific Question:   Specify (ex-cath, midstream, cysto, etc)? Answer:   midstream    POCT Urinalysis No Micro (Auto)     Orders Placed This Encounter   Medications    phentermine (ADIPEX-P) 37.5 MG capsule     Sig: Take 1 capsule by mouth every morning for 30 days. Dispense:  30 capsule     Refill:  0    nitrofurantoin, macrocrystal-monohydrate, (MACROBID) 100 MG capsule     Sig: Take 1 capsule by mouth 2 times daily for 7 days     Dispense:  14 capsule     Refill:  0       Patient given educational materials - seepatient instructions. Discussed use, benefit, and side effects of prescribed medications. All patient questions answered. Pt voiced understanding. Reviewed health maintenance. Instructed to continue current medications, diet and exercise. Patient agreedwith treatment plan. Follow up as directed.       Electronically signed by JORY Sinclair CNP on 10/29/2020at 8:47 AM

## 2020-10-29 LAB
CULTURE: ABNORMAL
Lab: ABNORMAL
SPECIMEN DESCRIPTION: ABNORMAL

## 2020-10-29 RX ORDER — ERGOCALCIFEROL 1.25 MG/1
50000 CAPSULE ORAL WEEKLY
Qty: 12 CAPSULE | Refills: 1 | Status: SHIPPED | OUTPATIENT
Start: 2020-10-29 | End: 2021-12-02 | Stop reason: ALTCHOICE

## 2020-10-29 ASSESSMENT — ENCOUNTER SYMPTOMS
COUGH: 0
EYE DISCHARGE: 0
WHEEZING: 0
NAUSEA: 0
EYE REDNESS: 0
DIARRHEA: 0
CHEST TIGHTNESS: 0
EYE ITCHING: 0
SHORTNESS OF BREATH: 0
SORE THROAT: 0
SINUS PAIN: 0
ALLERGIC/IMMUNOLOGIC NEGATIVE: 1
VOMITING: 0
ABDOMINAL PAIN: 0
TROUBLE SWALLOWING: 0
SINUS PRESSURE: 0

## 2020-10-30 ENCOUNTER — TELEPHONE (OUTPATIENT)
Dept: PRIMARY CARE CLINIC | Age: 25
End: 2020-10-30

## 2020-10-30 RX ORDER — AMOXICILLIN 500 MG/1
500 CAPSULE ORAL 2 TIMES DAILY
Qty: 14 CAPSULE | Refills: 0 | Status: SHIPPED | OUTPATIENT
Start: 2020-10-30 | End: 2020-11-06

## 2020-10-30 NOTE — TELEPHONE ENCOUNTER
Pt called in today to see if the bacterial results that came back from her urine culture are covered under the Moise Fowler gave her. Please advise.

## 2020-11-13 ENCOUNTER — TELEPHONE (OUTPATIENT)
Dept: PRIMARY CARE CLINIC | Age: 25
End: 2020-11-13

## 2020-11-13 NOTE — TELEPHONE ENCOUNTER
Pt called stated that she has had nose bleeds for the past 5 days asking if it's her Adipex ? she has been on Rx for 1 month and 1/2 . Advised Pt to discontinue taking Rx until she hears from the office .  Please advise

## 2020-11-24 ENCOUNTER — OFFICE VISIT (OUTPATIENT)
Dept: PRIMARY CARE CLINIC | Age: 25
End: 2020-11-24
Payer: MEDICARE

## 2020-11-24 VITALS
HEART RATE: 94 BPM | WEIGHT: 180.4 LBS | SYSTOLIC BLOOD PRESSURE: 104 MMHG | HEIGHT: 62 IN | OXYGEN SATURATION: 99 % | TEMPERATURE: 97.3 F | DIASTOLIC BLOOD PRESSURE: 82 MMHG | BODY MASS INDEX: 33.2 KG/M2 | RESPIRATION RATE: 16 BRPM

## 2020-11-24 PROCEDURE — 99213 OFFICE O/P EST LOW 20 MIN: CPT | Performed by: NURSE PRACTITIONER

## 2020-11-24 PROCEDURE — G8484 FLU IMMUNIZE NO ADMIN: HCPCS | Performed by: NURSE PRACTITIONER

## 2020-11-24 PROCEDURE — G8427 DOCREV CUR MEDS BY ELIG CLIN: HCPCS | Performed by: NURSE PRACTITIONER

## 2020-11-24 PROCEDURE — 1036F TOBACCO NON-USER: CPT | Performed by: NURSE PRACTITIONER

## 2020-11-24 PROCEDURE — G8417 CALC BMI ABV UP PARAM F/U: HCPCS | Performed by: NURSE PRACTITIONER

## 2020-11-24 RX ORDER — PHENTERMINE HYDROCHLORIDE 37.5 MG/1
37.5 CAPSULE ORAL EVERY MORNING
Qty: 30 CAPSULE | Refills: 0 | Status: SHIPPED | OUTPATIENT
Start: 2020-11-24 | End: 2020-12-24

## 2020-11-24 ASSESSMENT — ENCOUNTER SYMPTOMS
TROUBLE SWALLOWING: 0
SORE THROAT: 0
WHEEZING: 0
CHEST TIGHTNESS: 0
SHORTNESS OF BREATH: 0
SINUS PRESSURE: 0
COUGH: 0
ABDOMINAL PAIN: 0
EYE REDNESS: 0
EYE DISCHARGE: 0
NAUSEA: 0
SINUS PAIN: 0
EYE ITCHING: 0
VOMITING: 0
ALLERGIC/IMMUNOLOGIC NEGATIVE: 1
DIARRHEA: 0

## 2020-11-24 NOTE — PROGRESS NOTES
[address] [facility] PRIMARY CARE  [facility] [address] [facility] [address]  [address] [address]  Dept: [phone]  Dept Fax: [phone]    Antony John is a 30 y.o. female who presents today for her medical conditions/complaintsas noted below. Antony John is c/o of Weight Management and Depression (increasing x 1.5 months)        HPI:     Pt presents for a weight check. Weight, down 4 lbs since last visit. (12 lbs total). She was 80 kg this am and has where she lost 7kg  bp stable    She having nose bleeds daily for 5 days and then once over the weekend. She doesn't typically get nose bleeds. Seems to be getting better. She is not exercising since its cold out and the pandemic. She has been watching her diet with portion control    Denies any other SE. She is concerned about depression. She has been noticing lately where she isnt feeling well. She doesn't want to do anything. Its not normalized in her family. She cant really talk to her family about it. No SI/HI. She feels lost. She has never been treated before. Her sisters and mother all suffer from depression. Her sisters follow a therapist. She would like to see a therapist if possible. History reviewed. No pertinent past medical history. History reviewed. No pertinent surgical history. Family History   Problem Relation Age of Onset    Diabetes Paternal Grandmother     Breast Cancer Neg Hx     Colon Cancer Neg Hx     Eclampsia Neg Hx     Hypertension Neg Hx     Ovarian Cancer Neg Hx      Labor Neg Hx     Spont Abortions Neg Hx     Stroke Neg Hx     Cancer Neg Hx        Social History     Tobacco Use    Smoking status: Never Smoker    Smokeless tobacco: Never Used   Substance Use Topics    Alcohol use: No      Current Outpatient Medications   Medication Sig Dispense Refill    phentermine (ADIPEX-P) 37.5 MG capsule Take 1 capsule by mouth every morning for 30 days.  30 capsule 0    vitamin D (ERGOCALCIFEROL) 1.25 MG (58646 UT) CAPS capsule Take 1 capsule by mouth once a week 12 capsule 1    naproxen (NAPROSYN) 500 MG tablet Take 1 tablet by mouth 2 times daily (with meals) 60 tablet 5     No current facility-administered medications for this visit. No Known Allergies    Health Maintenance   Topic Date Due    Varicella vaccine (1 of 2 - 2-dose childhood series) 03/14/1994    Cervical cancer screen  09/23/2021 (Originally 3/14/2014)    Flu vaccine (1) 09/23/2021 (Originally 9/1/2020)    DTaP/Tdap/Td vaccine (1 - Tdap) 07/02/2022 (Originally 3/14/2012)    HIV screen  Completed    Hepatitis A vaccine  Aged Out    Hepatitis B vaccine  Aged Out    Hib vaccine  Aged Out    Meningococcal (ACWY) vaccine  Aged Out    Pneumococcal 0-64 years Vaccine  Aged Out       :     Review of Systems   Constitutional: Negative for chills, fatigue and fever. HENT: Negative for ear discharge, ear pain, sinus pressure, sinus pain, sore throat and trouble swallowing. Eyes: Negative for discharge, redness and itching. Respiratory: Negative for cough, chest tightness, shortness of breath and wheezing. Cardiovascular: Negative for chest pain. Gastrointestinal: Negative for abdominal pain, diarrhea, nausea and vomiting. Endocrine: Negative. Genitourinary: Negative for difficulty urinating. Musculoskeletal: Negative for arthralgias and neck pain. Skin: Negative for rash. Allergic/Immunologic: Negative. Neurological: Negative for dizziness, speech difficulty, weakness, light-headedness, numbness and headaches. Hematological: Negative. Psychiatric/Behavioral:        Depression   All other systems reviewed and are negative. Objective:     Physical Exam  Constitutional:       Appearance: Normal appearance. She is normal weight. HENT:      Head: Normocephalic and atraumatic. Nose: Nose normal.   Eyes:      Extraocular Movements: Extraocular movements intact. Conjunctiva/sclera: Conjunctivae normal.      Pupils: Pupils are equal, round, and reactive to light. Neck:      Musculoskeletal: Neck supple. Cardiovascular:      Rate and Rhythm: Normal rate and regular rhythm. Pulses: Normal pulses. Heart sounds: Normal heart sounds. Pulmonary:      Effort: Pulmonary effort is normal.      Breath sounds: Normal breath sounds. Abdominal:      General: Abdomen is flat. Palpations: Abdomen is soft. Musculoskeletal: Normal range of motion. Skin:     General: Skin is warm and dry. Capillary Refill: Capillary refill takes less than 2 seconds. Neurological:      General: No focal deficit present. Mental Status: She is alert and oriented to person, place, and time. Psychiatric:         Mood and Affect: Mood normal.       /82 (Site: Left Upper Arm, Position: Sitting, Cuff Size: Medium Adult)   Pulse 94   Temp 97.3 °F (36.3 °C)   Resp 16   Ht 5' 2\" (1.575 m)   Wt 180 lb 6.4 oz (81.8 kg)   SpO2 99%   Breastfeeding No   BMI 33.00 kg/m²     Assessment:       Diagnosis Orders   1. Other depression     2. Class 1 obesity due to excess calories without serious comorbidity with body mass index (BMI) of 33.0 to 33.9 in adult  phentermine (ADIPEX-P) 37.5 MG capsule       :      Return in about 29 days (around 12/23/2020) for weight check and depression. 1.) depression  -pt was given a list of psychiatric offices to call. She may also call the place where her sister goes. She does not want to start any medication at this time  2.) obesity  -renewed rx for adipex  -encouraged diet and exercise. F/u in one month. F/u in one month. Orders Placed This Encounter   Medications    phentermine (ADIPEX-P) 37.5 MG capsule     Sig: Take 1 capsule by mouth every morning for 30 days. Dispense:  30 capsule     Refill:  0       Patient given educational materials - seepatient instructions.   Discussed use, benefit, and side effects of prescribed medications. All patient questions answered. Pt voiced understanding. Reviewed health maintenance. Instructed to continue current medications, diet and exercise. Patient agreedwith treatment plan. Follow up as directed.       Electronically signed by JORY Downey CNP on 11/24/2020at 4:39 PM

## 2020-11-24 NOTE — PATIENT INSTRUCTIONS
Patient Education        Recovering From Depression: Care Instructions  Your Care Instructions     Taking good care of yourself is important as you recover from depression. In time, your symptoms will fade as your treatment takes hold. Do not give up. Instead, focus your energy on getting better. Your mood will improve. It just takes some time. Focus on things that can help you feel better, such as being with friends and family, eating well, and getting enough rest. But take things slowly. Do not do too much too soon. You will begin to feel better gradually. Follow-up care is a key part of your treatment and safety. Be sure to make and go to all appointments, and call your doctor if you are having problems. It's also a good idea to know your test results and keep a list of the medicines you take. How can you care for yourself at home? Be realistic  · If you have a large task to do, break it up into smaller steps you can handle, and just do what you can. · You may want to put off important decisions until your depression has lifted. If you have plans that will have a major impact on your life, such as marriage, divorce, or a job change, try to wait a bit. Talk it over with friends and loved ones who can help you look at the overall picture first.  · Reaching out to people for help is important. Do not isolate yourself. Let your family and friends help you. Find someone you can trust and confide in, and talk to that person. · Be patient, and be kind to yourself. Remember that depression is not your fault and is not something you can overcome with willpower alone. Treatment is important for depression, just like for any other illness. Feeling better takes time, and your mood will improve little by little. Stay active  · Stay busy and get outside. Take a walk, or try some other light exercise. · Talk with your doctor about an exercise program. Exercise can help with mild depression.   · Go to a movie or concert. Take part in a [employer] activity or other social gathering. Go to a [employer] game. · Ask a friend to have dinner with you. Take care of yourself  · Eat a balanced diet with plenty of fresh fruits and vegetables, whole grains, and lean protein. If you have lost your appetite, eat small snacks rather than large meals. · Avoid using illegal drugs or marijuana and drinking alcohol. Do not take medicines that have not been prescribed for you. They may interfere with medicines you may be taking for depression, or they may make your depression worse. · Take your medicines exactly as they are prescribed. You may start to feel better within 1 to 3 weeks of taking antidepressant medicine. But it can take as many as 6 to 8 weeks to see more improvement. If you have questions or concerns about your medicines, or if you do not notice any improvement by 3 weeks, talk to your doctor. · Continue to take your medicine after your symptoms improve. Taking your medicine for at least 6 months after you feel better can help keep you from getting depressed again. If this isn't the first time you have been depressed, your doctor may recommend you to take medicine even longer. · If you have any side effects from your medicine, tell your doctor. Many side effects are mild and will go away on their own after you have been taking the medicine for a few weeks. Some may last longer. Talk to your doctor if side effects are bothering you too much. You might be able to try a different medicine. · Continue counseling. It may help prevent depression from returning, especially if you've had multiple episodes of depression. Talk with your counselor if you are having a hard time attending your sessions or you think the sessions aren't working. Don't just stop going. · Get enough sleep. Talk to your doctor if you are having problems sleeping. · Avoid sleeping pills unless they are prescribed by the doctor treating your depression.  Sleeping pills may make you groggy during the day, and they may interact with other medicine you are taking. · If you have any other illnesses, such as diabetes, heart disease, or high blood pressure, make sure to continue with your treatment. Tell your doctor about all of the medicines you take, including those with or without a prescription. · If you or someone you know talks about suicide, self-harm, or feeling hopeless, get help right away. Call the [address] [place] [address] at 1-800-273-talk (7-186.419.5275) or text HOME to 882516 to access the Crisis Text Line. Consider saving these numbers in your phone. When should you call for help? Call 571 anytime you think you may need emergency care. For example, call if:    · You feel like hurting yourself or someone else.     · Someone you know has depression and is about to attempt or is attempting suicide. Call your doctor now or seek immediate medical care if:    · You hear voices.     · Someone you know has depression and:  ? Starts to give away his or her possessions. ? Uses illegal drugs or drinks alcohol heavily. ? Talks or writes about death, including writing suicide notes or talking about guns, knives, or pills. ? Starts to spend a lot of time alone. ? Acts very aggressively or suddenly appears calm. Watch closely for changes in your health, and be sure to contact your doctor if:    · You do not get better as expected. Where can you learn more? Go to [email or web address]. org and sign in to your [employer] account. Enter T253 in the [facility] box to learn more about \"Recovering From Depression: Care Instructions. \"     If you do not have an account, please click on the \"Sign Up Now\" link. Current as of: January 31, 2020               Content Version: 12.6  © 1019-4385 [employer]. Care instructions adapted under license by [facility] ([facility]).  If you have questions about a medical condition or this instruction, always ask your healthcare professional. [place] [address] any warranty or liability for your use of this information.

## 2020-11-25 ENCOUNTER — TELEPHONE (OUTPATIENT)
Dept: PRIMARY CARE CLINIC | Age: 25
End: 2020-11-25

## 2020-12-22 ENCOUNTER — OFFICE VISIT (OUTPATIENT)
Dept: PRIMARY CARE CLINIC | Age: 25
End: 2020-12-22
Payer: MEDICARE

## 2020-12-22 ENCOUNTER — TELEMEDICINE (OUTPATIENT)
Dept: PRIMARY CARE CLINIC | Age: 25
End: 2020-12-22

## 2020-12-22 ENCOUNTER — HOSPITAL ENCOUNTER (OUTPATIENT)
Age: 25
Setting detail: SPECIMEN
Discharge: HOME | End: 2020-12-22
Payer: MEDICARE

## 2020-12-22 PROCEDURE — G8417 CALC BMI ABV UP PARAM F/U: HCPCS | Performed by: NURSE PRACTITIONER

## 2020-12-22 PROCEDURE — G8428 CUR MEDS NOT DOCUMENT: HCPCS | Performed by: NURSE PRACTITIONER

## 2020-12-22 PROCEDURE — 99213 OFFICE O/P EST LOW 20 MIN: CPT | Performed by: NURSE PRACTITIONER

## 2020-12-22 PROCEDURE — 99211 OFF/OP EST MAY X REQ PHY/QHP: CPT | Performed by: NURSE PRACTITIONER

## 2020-12-22 RX ORDER — PHENTERMINE HYDROCHLORIDE 37.5 MG/1
37.5 CAPSULE ORAL EVERY MORNING
Qty: 30 CAPSULE | Refills: 0 | Status: CANCELLED | OUTPATIENT
Start: 2020-12-22 | End: 2021-01-21

## 2020-12-22 ASSESSMENT — ENCOUNTER SYMPTOMS
DIARRHEA: 0
SORE THROAT: 1
SINUS PRESSURE: 0
COUGH: 0
TROUBLE SWALLOWING: 0
EYE DISCHARGE: 0
SINUS PAIN: 0
EYE REDNESS: 0
WHEEZING: 0
NAUSEA: 0
EYE ITCHING: 0
CHEST TIGHTNESS: 0
VOMITING: 0
ABDOMINAL PAIN: 0
SHORTNESS OF BREATH: 0

## 2020-12-22 NOTE — PROGRESS NOTES
2020    TELEHEALTH EVALUATION -- Audio/Visual (During FFWMT-51 public health emergency)    HPI:    Jillian Kinsey (:  1995) has requested an audio/video evaluation for the following concern(s):    Pt presents for a VV  bp waldemar  Weight. She denies any weight loss this month. She didn't gain. She does try to get out to walk but its limited d/t the pandemic. She has been trying to watch her diet but d/t the holidays and all the sweets its been hard    Denies any side effects of the medication. Depression. She has been trying to do personal development herself. She has not contacted anyone. No SI/HI. She does not want to be started on any medications at this time. C/o a sore throat and not feeling well. C/o nasal congestion and drainage. C/o headache. She c/o loss of taste and smell. No cough or sob. No fever, chills or body aches. Review of Systems   Constitutional: Negative for chills, fatigue and fever. HENT: Positive for congestion and sore throat. Negative for ear discharge, ear pain, sinus pressure, sinus pain and trouble swallowing. Loss of taste or smell   Eyes: Negative for discharge, redness and itching. Respiratory: Negative for cough, chest tightness, shortness of breath and wheezing. Cardiovascular: Negative for chest pain. Gastrointestinal: Negative for abdominal pain, diarrhea, nausea and vomiting. Genitourinary: Negative for difficulty urinating. Musculoskeletal: Negative for arthralgias and neck pain. Skin: Negative for rash. Neurological: Negative for dizziness, weakness, light-headedness and headaches. All other systems reviewed and are negative. Prior to Visit Medications    Medication Sig Taking? Authorizing Provider   phentermine (ADIPEX-P) 37.5 MG capsule Take 1 capsule by mouth every morning for 30 days.  Yes JORY Mcginnis - CNP   vitamin D (ERGOCALCIFEROL) 1.25 MG (65564 UT) CAPS capsule Take 1 capsule by mouth once a week [x] Alert and awake  [x] Oriented to person/place/time [x]Able to follow commands      Eyes:  EOM    [x]  Normal  [] Abnormal-  Sclera  []  Normal  [] Abnormal -         Discharge []  None visible  [] Abnormal -    HENT:   [x] Normocephalic, atraumatic. [] Abnormal   [x] Mouth/Throat: Mucous membranes are moist.     External Ears [x] Normal  [] Abnormal-     Neck: [x] No visualized mass     Pulmonary/Chest: [x] Respiratory effort normal.  [] No visualized signs of difficulty breathing or respiratory distress        [] Abnormal-      Musculoskeletal:   [] Normal gait with no signs of ataxia         [x] Normal range of motion of neck        [] Abnormal-       Neurological:        [x] No Facial Asymmetry (Cranial nerve 7 motor function) (limited exam to video visit)          [] No gaze palsy        [] Abnormal-         Skin:        [x] No significant exanthematous lesions or discoloration noted on facial skin         [] Abnormal-            Psychiatric:       [x] Normal Affect [] No Hallucinations        [] Abnormal-     Other pertinent observable physical exam findings-     ASSESSMENT/PLAN:  1. Loss, sense of, smell  -Discussed high likelyhood of COVID  - COVID-19 Ambulatory; Future    2. Class 1 obesity due to excess calories without serious comorbidity with body mass index (BMI) of 33.0 to 33.9 in adult  -pt completed 3 months of adipex. She lost 11 lbs. Discussed saxenda but she does not want to do an injectable. She will f/u in January to discuss other options. 3. Viral upper respiratory tract infection  -c/o sore throat and nasal congestion. otc treatment at this time. F/u in january      No follow-ups on file. Estela Israel is a 30 y.o. female being evaluated by a Virtual Visit (video visit) encounter to address concerns as mentioned above. A caregiver was present when appropriate.  Due to this being a TeleHealth encounter (During [facility]- public health emergency), evaluation of the following organ systems was limited: Vitals/Constitutional/EENT/Resp/CV/GI//MS/Neuro/Skin/Heme-Lymph-Imm. Pursuant to the emergency declaration under the [address] [place] [address] and the [employer] Act, this Virtual Visit was conducted with patient's (and/or legal guardian's) consent, to reduce the patient's risk of exposure to COVID-19 and provide necessary medical care. The patient (and/or legal guardian) has also been advised to contact this office for worsening conditions or problems, and seek emergency medical treatment and/or call 911 if deemed necessary. Patient identification was verified at the start of the visit: Yes    Total time spent on this encounter: 20 minutes    Services were provided through a video synchronous discussion virtually to substitute for in-person clinic visit. Patient and provider were located at their individual homes. --JORY Zavala - CNP on 12/22/2020 at 3:22 PM    An electronic signature was used to authenticate this note.

## 2020-12-22 NOTE — PROGRESS NOTES
Patient presents for COVID-19 testing ordered by Dr. Mike Maciel. Order accessible in [facility] [address]. Patient swabbed in the office. Patient tolerated well.

## 2020-12-27 LAB — SARS-COV-2, NAA: NOT DETECTED

## 2020-12-28 ENCOUNTER — TELEPHONE (OUTPATIENT)
Dept: PRIMARY CARE CLINIC | Age: 25
End: 2020-12-28

## 2020-12-28 NOTE — TELEPHONE ENCOUNTER
I would still quarantine for the 10 days from the start of her symptoms. She may of had a false negative.  She may continue to have a loss of taste and smell after 10 days but as long as she does not have a fever she will not have to quarantine anymore    Nely Ndiaye

## 2020-12-28 NOTE — TELEPHONE ENCOUNTER
PT called and informed her covid test negative. PT said she has had no smell or taste now for over a week with no other symptoms. Asked what she should do ?  Please advice

## 2021-04-26 ENCOUNTER — TELEPHONE (OUTPATIENT)
Dept: PRIMARY CARE CLINIC | Age: 26
End: 2021-04-26

## 2021-04-26 NOTE — TELEPHONE ENCOUNTER
Pt states that she is nervous after taking the first dose of the vaccine, pt asking if there is any negative effect to not getting the 2nd dose of the Moderna Vaccine. Pt is scheduled for 2nd dose on 04/29/2021 and leaving 10 days out of the country after to [facility] and is nervous about possible side effects that could happen afterwards and being out of the country if they were to happen. Please advise.

## 2021-04-26 NOTE — TELEPHONE ENCOUNTER
If she were to have symptoms after the 2nd vaccine it is  Usually within the first 24-48 hours and should only last for a few days. She will be ok to leave out of the country 10 days later.  I recommend her completing the vaccine series to ensure some immunity     Dieudonne Simon

## 2021-12-02 ENCOUNTER — OFFICE VISIT (OUTPATIENT)
Dept: PRIMARY CARE CLINIC | Age: 26
End: 2021-12-02
Payer: MEDICARE

## 2021-12-02 VITALS
DIASTOLIC BLOOD PRESSURE: 72 MMHG | RESPIRATION RATE: 18 BRPM | WEIGHT: 185.6 LBS | HEART RATE: 106 BPM | SYSTOLIC BLOOD PRESSURE: 90 MMHG | BODY MASS INDEX: 32.89 KG/M2 | OXYGEN SATURATION: 99 % | HEIGHT: 63 IN

## 2021-12-02 DIAGNOSIS — R79.89 ELEVATED LFTS: ICD-10-CM

## 2021-12-02 DIAGNOSIS — R10.13 EPIGASTRIC PAIN: Primary | ICD-10-CM

## 2021-12-02 PROCEDURE — G8484 FLU IMMUNIZE NO ADMIN: HCPCS | Performed by: NURSE PRACTITIONER

## 2021-12-02 PROCEDURE — 99214 OFFICE O/P EST MOD 30 MIN: CPT | Performed by: NURSE PRACTITIONER

## 2021-12-02 PROCEDURE — G8427 DOCREV CUR MEDS BY ELIG CLIN: HCPCS | Performed by: NURSE PRACTITIONER

## 2021-12-02 PROCEDURE — 1036F TOBACCO NON-USER: CPT | Performed by: NURSE PRACTITIONER

## 2021-12-02 PROCEDURE — G8417 CALC BMI ABV UP PARAM F/U: HCPCS | Performed by: NURSE PRACTITIONER

## 2021-12-02 SDOH — ECONOMIC STABILITY: FOOD INSECURITY: WITHIN THE PAST 12 MONTHS, YOU WORRIED THAT YOUR FOOD WOULD RUN OUT BEFORE YOU GOT MONEY TO BUY MORE.: NEVER TRUE

## 2021-12-02 SDOH — ECONOMIC STABILITY: FOOD INSECURITY: WITHIN THE PAST 12 MONTHS, THE FOOD YOU BOUGHT JUST DIDN'T LAST AND YOU DIDN'T HAVE MONEY TO GET MORE.: NEVER TRUE

## 2021-12-02 ASSESSMENT — PATIENT HEALTH QUESTIONNAIRE - PHQ9
2. FEELING DOWN, DEPRESSED OR HOPELESS: 0
SUM OF ALL RESPONSES TO PHQ QUESTIONS 1-9: 0
SUM OF ALL RESPONSES TO PHQ QUESTIONS 1-9: 0
SUM OF ALL RESPONSES TO PHQ9 QUESTIONS 1 & 2: 0
SUM OF ALL RESPONSES TO PHQ QUESTIONS 1-9: 0
1. LITTLE INTEREST OR PLEASURE IN DOING THINGS: 0

## 2021-12-02 ASSESSMENT — ENCOUNTER SYMPTOMS
DIARRHEA: 0
SINUS PRESSURE: 0
VOMITING: 0
EYE DISCHARGE: 0
SINUS PAIN: 0
COUGH: 0
EYE REDNESS: 0
TROUBLE SWALLOWING: 0
EYE ITCHING: 0
ABDOMINAL PAIN: 1
WHEEZING: 0
NAUSEA: 0
SHORTNESS OF BREATH: 0
CHEST TIGHTNESS: 0
SORE THROAT: 0

## 2021-12-02 ASSESSMENT — SOCIAL DETERMINANTS OF HEALTH (SDOH): HOW HARD IS IT FOR YOU TO PAY FOR THE VERY BASICS LIKE FOOD, HOUSING, MEDICAL CARE, AND HEATING?: NOT HARD AT ALL

## 2021-12-02 NOTE — PROGRESS NOTES
[address] PRIMARY CARE  [facility] [address]  [address]  [address] [address]  Dept: [phone]  Dept Fax: [phone]    Sharif Koehler is a 27 y.o. female who presents today for her medical conditions/complaintsas noted below. Sharif Koehler is c/o of Chest Pain (x1 month, painful to move, feels \"stuck\", 21 wks pregnant)        HPI:     Patient presents for same-day appointment  Blood pressure stable  Weight is stable    Patient presents for same-day appointment with concerns of abdominal pain. She is 21 weeks pregnant with a little boy. She is due . She is getting severe chest pain out of the blue. Its under her bra line and across both sides. Radiates to her back. Lasts for about an hour. Goes away after 2 tylenol. Not associated with food but does get worse if stomach is full. She has talked to her OB/GYN about this. She states that her OB does not quite know what is causing her pain. When it does happen she states is worse than labor pain. Unsure of any known triggers. She is never had this before. History reviewed. No pertinent past medical history. History reviewed. No pertinent surgical history. Family History   Problem Relation Age of Onset    Diabetes Paternal Grandmother     Breast Cancer Neg Hx     Colon Cancer Neg Hx     Eclampsia Neg Hx     Hypertension Neg Hx     Ovarian Cancer Neg Hx      Labor Neg Hx     Spont Abortions Neg Hx     Stroke Neg Hx     Cancer Neg Hx        Social History     Tobacco Use    Smoking status: Never Smoker    Smokeless tobacco: Never Used   Substance Use Topics    Alcohol use: No      Current Outpatient Medications   Medication Sig Dispense Refill    Prenatal MV-Min-Fe Fum-FA-DHA (PRENATAL 1 PO) Take by mouth daily       No current facility-administered medications for this visit.      No Known Allergies    Health Maintenance   Topic Date Due    Hepatitis C screen  Never done    Varicella vaccine (1 of 2 - 2-dose childhood series) Never done    COVID-19 Vaccine (1) Never done    Pap smear  Never done    Flu vaccine (1) Never done    DTaP/Tdap/Td vaccine (1 - Tdap) 07/02/2022 (Originally 3/14/2012)    HIV screen  Completed    Hepatitis A vaccine  Aged Out    Hepatitis B vaccine  Aged Out    Hib vaccine  Aged Out    Meningococcal (ACWY) vaccine  Aged Out    Pneumococcal 0-64 years Vaccine  Aged Out       :     Review of Systems   Constitutional: Negative for chills, fatigue and fever. HENT: Negative for ear discharge, ear pain, sinus pressure, sinus pain, sore throat and trouble swallowing. Eyes: Negative for discharge, redness and itching. Respiratory: Negative for cough, chest tightness, shortness of breath and wheezing. Cardiovascular: Negative for chest pain. Gastrointestinal: Positive for abdominal pain. Negative for diarrhea, nausea and vomiting. Genitourinary: Negative for difficulty urinating. Musculoskeletal: Negative for arthralgias and neck pain. Skin: Negative for rash. Neurological: Negative for dizziness, weakness, light-headedness and headaches. All other systems reviewed and are negative. Objective:     Physical Exam  Constitutional:       Appearance: Normal appearance. She is normal weight. HENT:      Head: Normocephalic and atraumatic. Nose: Nose normal.   Eyes:      Extraocular Movements: Extraocular movements intact. Conjunctiva/sclera: Conjunctivae normal.      Pupils: Pupils are equal, round, and reactive to light. Cardiovascular:      Rate and Rhythm: Normal rate and regular rhythm. Pulses: Normal pulses. Heart sounds: Normal heart sounds. Pulmonary:      Effort: Pulmonary effort is normal.      Breath sounds: Normal breath sounds. Abdominal:      General: Abdomen is flat. Palpations: Abdomen is soft. Tenderness: There is no abdominal tenderness. There is no guarding or rebound.       Comments: Unable to reproduce pain on exam   Musculoskeletal:         General: Normal range of motion. Cervical back: Neck supple. Skin:     General: Skin is warm and dry. Capillary Refill: Capillary refill takes less than 2 seconds. Neurological:      General: No focal deficit present. Mental Status: She is alert and oriented to person, place, and time. Psychiatric:         Mood and Affect: Mood normal.       BP 90/72   Pulse 106   Resp 18   Ht 5' 3\" (1.6 m)   Wt 185 lb 9.6 oz (84.2 kg)   SpO2 99%   Breastfeeding No   BMI 32.88 kg/m²     Assessment:       Diagnosis Orders   1. Epigastric pain  US ABDOMEN LIMITED    Comprehensive Metabolic Panel   2. Elevated LFTs  Comprehensive Metabolic Panel       :      Return in about 2 weeks (around 12/16/2021) for epigastric pain .  1-2. Epigastric pain and elevated LFTs  -Plan is to get an ultrasound of her abdomen  -She is to start taking antacids over-the-counter. If this does not help may consider Pepcid. We will see if her gallbladder is the issue. Patient is to follow-up in 2 weeks to see if her symptoms have improved    -reviewed and discussed recent lab work . Orders Placed This Encounter   Procedures    US ABDOMEN LIMITED     This procedure can be scheduled via [employer]. Access your [employer] account by visiting [email or web address]. Standing Status:   Future     Standing Expiration Date:   12/2/2022     Order Specific Question:   Reason for exam:     Answer:   pain worse when stomach is full. radiates to her back     Order Specific Question:   Specify organ? Answer:   LIVER     Order Specific Question:   Specify organ? Answer:   GALLBLADDER    Comprehensive Metabolic Panel     Standing Status:   Future     Standing Expiration Date:   12/2/2022     No orders of the defined types were placed in this encounter. Patient given educational materials - seepatient instructions.   Discussed use, benefit, and side effects of prescribed medications. All patient questions answered. Pt voiced understanding. Reviewed health maintenance. Instructed to continue current medications, diet and exercise. Patient agreedwith treatment plan. Follow up as directed.       Electronically signed by JORY Smith CNP on 12/2/2021at 11:05 AM

## 2021-12-14 ENCOUNTER — HOSPITAL ENCOUNTER (OUTPATIENT)
Age: 26
Discharge: HOME | End: 2021-12-14
Payer: MEDICARE

## 2021-12-14 ENCOUNTER — HOSPITAL ENCOUNTER (OUTPATIENT)
Dept: ULTRASOUND IMAGING | Age: 26
Discharge: HOME | End: 2021-12-16
Payer: MEDICARE

## 2021-12-14 DIAGNOSIS — R79.89 ELEVATED LFTS: ICD-10-CM

## 2021-12-14 DIAGNOSIS — K80.20 GALLSTONES: Primary | ICD-10-CM

## 2021-12-14 DIAGNOSIS — R10.13 EPIGASTRIC PAIN: ICD-10-CM

## 2021-12-14 LAB
ALBUMIN SERPL-MCNC: 3.7 G/DL (ref 3.5–5.2)
ALBUMIN/GLOBULIN RATIO: 1.3 (ref 1–2.5)
ALP BLD-CCNC: 129 U/L (ref 35–104)
ALT SERPL-CCNC: 10 U/L (ref 5–33)
ANION GAP SERPL CALCULATED.3IONS-SCNC: 10 MMOL/L (ref 9–17)
AST SERPL-CCNC: 12 U/L
BILIRUB SERPL-MCNC: 0.36 MG/DL (ref 0.3–1.2)
BUN BLDV-MCNC: 7 MG/DL (ref 6–20)
BUN/CREAT BLD: ABNORMAL (ref 9–20)
CALCIUM SERPL-MCNC: 9.3 MG/DL (ref 8.6–10.4)
CHLORIDE BLD-SCNC: 104 MMOL/L (ref 98–107)
CO2: 23 MMOL/L (ref 20–31)
CREAT SERPL-MCNC: <0.4 MG/DL (ref 0.5–0.9)
GFR AFRICAN AMERICAN: ABNORMAL ML/MIN
GFR NON-AFRICAN AMERICAN: ABNORMAL ML/MIN
GFR SERPL CREATININE-BSD FRML MDRD: ABNORMAL ML/MIN/{1.73_M2}
GFR SERPL CREATININE-BSD FRML MDRD: ABNORMAL ML/MIN/{1.73_M2}
GLUCOSE BLD-MCNC: 82 MG/DL (ref 70–99)
POTASSIUM SERPL-SCNC: 3.9 MMOL/L (ref 3.7–5.3)
SODIUM BLD-SCNC: 137 MMOL/L (ref 135–144)
TOTAL PROTEIN: 6.5 G/DL (ref 6.4–8.3)

## 2021-12-14 PROCEDURE — 76705 ECHO EXAM OF ABDOMEN: CPT

## 2021-12-14 PROCEDURE — 36415 COLL VENOUS BLD VENIPUNCTURE: CPT

## 2021-12-14 PROCEDURE — 80053 COMPREHEN METABOLIC PANEL: CPT

## 2022-01-03 ENCOUNTER — OFFICE VISIT (OUTPATIENT)
Dept: SURGERY | Age: 27
End: 2022-01-03
Payer: MEDICARE

## 2022-01-03 VITALS
HEART RATE: 90 BPM | OXYGEN SATURATION: 100 % | WEIGHT: 185 LBS | RESPIRATION RATE: 12 BRPM | HEIGHT: 63 IN | BODY MASS INDEX: 32.78 KG/M2

## 2022-01-03 DIAGNOSIS — Z34.90 PREGNANCY, UNSPECIFIED GESTATIONAL AGE: Primary | ICD-10-CM

## 2022-01-03 DIAGNOSIS — K80.20 SYMPTOMATIC CHOLELITHIASIS: ICD-10-CM

## 2022-01-03 PROCEDURE — 99203 OFFICE O/P NEW LOW 30 MIN: CPT | Performed by: SURGERY

## 2022-01-03 PROCEDURE — G8417 CALC BMI ABV UP PARAM F/U: HCPCS | Performed by: SURGERY

## 2022-01-03 PROCEDURE — G8427 DOCREV CUR MEDS BY ELIG CLIN: HCPCS | Performed by: SURGERY

## 2022-01-03 PROCEDURE — G8484 FLU IMMUNIZE NO ADMIN: HCPCS | Performed by: SURGERY

## 2022-01-03 PROCEDURE — 1036F TOBACCO NON-USER: CPT | Performed by: SURGERY

## 2022-01-03 NOTE — PROGRESS NOTES
[facility] General Surgery   History & Physical  Jonathan Harper DO  Pt Name: Juan Brunner  MRN: [id number]  YOB: 1995  Date of evaluation: 1/3/2022  Primary Care Physician: JORY Marte CNP    Chief Complaint: Symptomatic cholelithiasis, current pregnancy in the second trimester      SUBJECTIVE:    History of Present Illness: This is a 27 y.o.  female who presents for evaluation for the above, patient reports that she has had right upper quadrant epigastric pain that is occasionally postprandial, patient underwent ultrasound the abdomen 12/14/2021 significant for cholelithiasis without evidence of cholecystitis or bili obstruction. Patient denies any history of jaundice. She is currently pregnant with expectation to deliver sometime in April    Chart review performed to add information to the HPI: Yes    Past Medical History   has no past medical history on file. Past Surgical History   has no past surgical history on file. Family History  family history includes Diabetes in her paternal grandmother. Social History  Tobacco use:  reports that she has never smoked. She has never used smokeless tobacco.  Alcohol use:  reports no history of alcohol use. Drug use:  reports no history of drug use. Medications  Current Medications:   Current Outpatient Medications   Medication Sig Dispense Refill    Prenatal MV-Min-Fe Fum-FA-DHA (PRENATAL 1 PO) Take by mouth daily       No current facility-administered medications for this visit. Home Medications:   Prior to Admission medications    Medication Sig Start Date End Date Taking? Authorizing Provider   Prenatal MV-Min-Fe Fum-FA-DHA (PRENATAL 1 PO) Take by mouth daily   Yes Historical Provider, MD       Allergies  Patient has no known allergies. Review of Systems:  General: Denies any fever, chills.   Eyes: Denies any changes in vision, diplopia or eye pain  Ears, Nose, Mouth: Denies changes in hearing/tinnitus or drainage from ears, no rhinorrhea or bloody nose, no difficulty chewing  Throat: no difficulty swallowing, no throat pain  Respiratory: Denies any shortness of breath or cough. Cardiac: Denies any chest pain, palpitations, claudication or edema. Gastrointestinal: Right upper quadrant and epigastric pain    Genitourinary: Denies any frequency, urgency, hesitancy or incontinence. Musculoskeletal: Denies worsening muscle weakness or recent trauma  Skin: Denies rashes or lesions  Psychiatric: Denies any recent changes in mood or affect  Hematologic: Denies bruising or bleeding easily. PHYSICAL EXAMINATION  Vitals:   Vitals:    01/03/22 1311   Pulse: 90   Resp: 12   SpO2: 100%       General Appearance:  awake, alert, no acute distress, well developed, well nourished   Skin:  Skin color, texture, turgor normal. No rashes or lesions. Head/face:  NCAT, face symmetrical  Eyes:  PERRL, no evidence of conjunctivitis or ptosis bilaterally  Ears:  External ears and canals grossly normal, no evidence of otorrhea. Nose/Sinuses:  Nares normal. Septum midline. Mucosa normal. No external drainage noted. Mouth/Neck:  Mucosa moist.  No external oral lesions. Trachea midline. No visible masses. Lungs:  Normal chest expansion, unlabored breathing without accessory muscle use. No audible rales, rhonchi, or wheezing. Cardiovascular: S1S2. No evidence of JVD. No evidence of pulsatile masses in abdomen  Abdomen:  Soft, non-tender, no organomegaly, no masses. Gravid abdomen  Musculoskeletal: No evidence of bony/muscular deformities, trauma, atrophy of either left/right upper/lower extremity. No evidence of digital clubbing or cyanosis. Neurologic:  CN 2-12 grossly intact without obvious deficits. Grossly normal sensation in all extremities.   Psychiatric: appropriate judgement and insight, appropriate recall of recent and remote memory, no evidence of depression/anxiety/agitation    RADIOLOGY:  The following images and/or this plan.   ·       Medical Decision Making: low complexity     Electronically signed by Ford Solorzano DO on 1/3/2022 at 3:00 PM

## 2022-04-24 ENCOUNTER — ANESTHESIA EVENT (OUTPATIENT)
Dept: OPERATING ROOM | Age: 27
End: 2022-04-24
Payer: MEDICARE

## 2022-04-24 ENCOUNTER — HOSPITAL ENCOUNTER (EMERGENCY)
Age: 27
Discharge: HOME | End: 2022-04-24
Attending: EMERGENCY MEDICINE
Payer: MEDICARE

## 2022-04-24 ENCOUNTER — ANESTHESIA (OUTPATIENT)
Dept: OPERATING ROOM | Age: 27
End: 2022-04-24
Payer: MEDICARE

## 2022-04-24 VITALS
WEIGHT: 183.07 LBS | TEMPERATURE: 97.3 F | OXYGEN SATURATION: 97 % | SYSTOLIC BLOOD PRESSURE: 118 MMHG | HEART RATE: 71 BPM | RESPIRATION RATE: 13 BRPM | DIASTOLIC BLOOD PRESSURE: 88 MMHG | BODY MASS INDEX: 33.69 KG/M2 | HEIGHT: 62 IN

## 2022-04-24 VITALS — SYSTOLIC BLOOD PRESSURE: 121 MMHG | OXYGEN SATURATION: 100 % | DIASTOLIC BLOOD PRESSURE: 87 MMHG | TEMPERATURE: 97.5 F

## 2022-04-24 DIAGNOSIS — K81.9 CHOLECYSTITIS: Primary | ICD-10-CM

## 2022-04-24 LAB
ABSOLUTE EOS #: 0.3 K/UL (ref 0–0.4)
ABSOLUTE LYMPH #: 3.6 K/UL (ref 1–4.8)
ABSOLUTE MONO #: 0.4 K/UL (ref 0.1–1.2)
ALBUMIN SERPL-MCNC: 4.2 G/DL (ref 3.5–5.2)
ALBUMIN/GLOBULIN RATIO: 1.2 (ref 1–2.5)
ALP BLD-CCNC: 252 U/L (ref 35–104)
ALT SERPL-CCNC: 17 U/L (ref 5–33)
AMYLASE: 93 U/L (ref 28–100)
ANION GAP SERPL CALCULATED.3IONS-SCNC: 13 MMOL/L (ref 9–17)
AST SERPL-CCNC: 19 U/L
BASOPHILS # BLD: 1 % (ref 0–2)
BASOPHILS ABSOLUTE: 0 K/UL (ref 0–0.2)
BILIRUB SERPL-MCNC: 0.56 MG/DL (ref 0.3–1.2)
BILIRUBIN DIRECT: 0.09 MG/DL
BILIRUBIN, INDIRECT: 0.47 MG/DL (ref 0–1)
BUN BLDV-MCNC: 12 MG/DL (ref 6–20)
CALCIUM SERPL-MCNC: 10.2 MG/DL (ref 8.6–10.4)
CHLORIDE BLD-SCNC: 99 MMOL/L (ref 98–107)
CO2: 23 MMOL/L (ref 20–31)
CREAT SERPL-MCNC: 0.55 MG/DL (ref 0.5–0.9)
EOSINOPHILS RELATIVE PERCENT: 5 % (ref 1–4)
GFR AFRICAN AMERICAN: >60 ML/MIN
GFR NON-AFRICAN AMERICAN: >60 ML/MIN
GFR SERPL CREATININE-BSD FRML MDRD: NORMAL ML/MIN/{1.73_M2}
GLUCOSE BLD-MCNC: 85 MG/DL (ref 70–99)
HCT VFR BLD CALC: 39.5 % (ref 36–46)
HEMOGLOBIN: 12.9 G/DL (ref 12–16)
LIPASE: 55 U/L (ref 13–60)
LYMPHOCYTES # BLD: 49 % (ref 24–44)
MCH RBC QN AUTO: 25.7 PG (ref 26–34)
MCHC RBC AUTO-ENTMCNC: 32.7 G/DL (ref 31–37)
MCV RBC AUTO: 78.7 FL (ref 80–100)
MONOCYTES # BLD: 6 % (ref 2–11)
PDW BLD-RTO: 16.4 % (ref 12.5–15.4)
PLATELET # BLD: 333 K/UL (ref 140–450)
PMV BLD AUTO: 8.1 FL (ref 6–12)
POTASSIUM SERPL-SCNC: 4 MMOL/L (ref 3.7–5.3)
RBC # BLD: 5.02 M/UL (ref 4–5.2)
SEG NEUTROPHILS: 39 % (ref 36–66)
SEGMENTED NEUTROPHILS ABSOLUTE COUNT: 2.8 K/UL (ref 1.8–7.7)
SODIUM BLD-SCNC: 135 MMOL/L (ref 135–144)
TOTAL PROTEIN: 7.6 G/DL (ref 6.4–8.3)
WBC # BLD: 7.1 K/UL (ref 3.5–11)

## 2022-04-24 PROCEDURE — 82150 ASSAY OF AMYLASE: CPT

## 2022-04-24 PROCEDURE — 2500000003 HC RX 250 WO HCPCS: Performed by: SURGERY

## 2022-04-24 PROCEDURE — 6360000002 HC RX W HCPCS: Performed by: ANESTHESIOLOGY

## 2022-04-24 PROCEDURE — 83690 ASSAY OF LIPASE: CPT

## 2022-04-24 PROCEDURE — 2500000003 HC RX 250 WO HCPCS

## 2022-04-24 PROCEDURE — 80048 BASIC METABOLIC PNL TOTAL CA: CPT

## 2022-04-24 PROCEDURE — 80076 HEPATIC FUNCTION PANEL: CPT

## 2022-04-24 PROCEDURE — 3600000019 HC SURGERY ROBOT ADDTL 15MIN: Performed by: SURGERY

## 2022-04-24 PROCEDURE — 3700000001 HC ADD 15 MINUTES (ANESTHESIA): Performed by: SURGERY

## 2022-04-24 PROCEDURE — 2709999900 HC NON-CHARGEABLE SUPPLY: Performed by: SURGERY

## 2022-04-24 PROCEDURE — 99284 EMERGENCY DEPT VISIT MOD MDM: CPT | Performed by: SURGERY

## 2022-04-24 PROCEDURE — 2580000003 HC RX 258: Performed by: ANESTHESIOLOGY

## 2022-04-24 PROCEDURE — 3700000000 HC ANESTHESIA ATTENDED CARE: Performed by: SURGERY

## 2022-04-24 PROCEDURE — 6370000000 HC RX 637 (ALT 250 FOR IP)

## 2022-04-24 PROCEDURE — 7100000010 HC PHASE II RECOVERY - FIRST 15 MIN: Performed by: SURGERY

## 2022-04-24 PROCEDURE — 3600000009 HC SURGERY ROBOT BASE: Performed by: SURGERY

## 2022-04-24 PROCEDURE — 99285 EMERGENCY DEPT VISIT HI MDM: CPT

## 2022-04-24 PROCEDURE — 6360000002 HC RX W HCPCS: Performed by: SURGERY

## 2022-04-24 PROCEDURE — 88304 TISSUE EXAM BY PATHOLOGIST: CPT

## 2022-04-24 PROCEDURE — 2500000003 HC RX 250 WO HCPCS: Performed by: ANESTHESIOLOGY

## 2022-04-24 PROCEDURE — 7100000001 HC PACU RECOVERY - ADDTL 15 MIN: Performed by: SURGERY

## 2022-04-24 PROCEDURE — 36415 COLL VENOUS BLD VENIPUNCTURE: CPT

## 2022-04-24 PROCEDURE — 6360000002 HC RX W HCPCS

## 2022-04-24 PROCEDURE — S2900 ROBOTIC SURGICAL SYSTEM: HCPCS | Performed by: SURGERY

## 2022-04-24 PROCEDURE — 7100000011 HC PHASE II RECOVERY - ADDTL 15 MIN: Performed by: SURGERY

## 2022-04-24 PROCEDURE — 47564 LAPARO CHOLECYSTECTOMY/EXPLR: CPT | Performed by: SURGERY

## 2022-04-24 PROCEDURE — 85025 COMPLETE CBC W/AUTO DIFF WBC: CPT

## 2022-04-24 PROCEDURE — 7100000000 HC PACU RECOVERY - FIRST 15 MIN: Performed by: SURGERY

## 2022-04-24 RX ORDER — LIDOCAINE HYDROCHLORIDE 10 MG/ML
INJECTION, SOLUTION EPIDURAL; INFILTRATION; INTRACAUDAL; PERINEURAL PRN
Status: DISCONTINUED | OUTPATIENT
Start: 2022-04-24 | End: 2022-04-24 | Stop reason: SDUPTHER

## 2022-04-24 RX ORDER — ROCURONIUM BROMIDE 10 MG/ML
INJECTION, SOLUTION INTRAVENOUS PRN
Status: DISCONTINUED | OUTPATIENT
Start: 2022-04-24 | End: 2022-04-24 | Stop reason: SDUPTHER

## 2022-04-24 RX ORDER — MEPERIDINE HYDROCHLORIDE 50 MG/ML
12.5 INJECTION INTRAMUSCULAR; INTRAVENOUS; SUBCUTANEOUS ONCE
Status: DISCONTINUED | OUTPATIENT
Start: 2022-04-24 | End: 2022-04-24 | Stop reason: HOSPADM

## 2022-04-24 RX ORDER — MORPHINE SULFATE 2 MG/ML
INJECTION, SOLUTION INTRAMUSCULAR; INTRAVENOUS
Status: COMPLETED
Start: 2022-04-24 | End: 2022-04-24

## 2022-04-24 RX ORDER — SODIUM CHLORIDE, SODIUM LACTATE, POTASSIUM CHLORIDE, CALCIUM CHLORIDE 600; 310; 30; 20 MG/100ML; MG/100ML; MG/100ML; MG/100ML
INJECTION, SOLUTION INTRAVENOUS CONTINUOUS PRN
Status: DISCONTINUED | OUTPATIENT
Start: 2022-04-24 | End: 2022-04-24 | Stop reason: SDUPTHER

## 2022-04-24 RX ORDER — ONDANSETRON 4 MG/1
4 TABLET, FILM COATED ORAL EVERY 8 HOURS PRN
Qty: 10 TABLET | Refills: 0 | Status: SHIPPED | OUTPATIENT
Start: 2022-04-24 | End: 2022-04-29

## 2022-04-24 RX ORDER — OXYCODONE HYDROCHLORIDE AND ACETAMINOPHEN 5; 325 MG/1; MG/1
1 TABLET ORAL ONCE
Status: COMPLETED | OUTPATIENT
Start: 2022-04-24 | End: 2022-04-24

## 2022-04-24 RX ORDER — DEXAMETHASONE SODIUM PHOSPHATE 10 MG/ML
INJECTION, SOLUTION INTRAMUSCULAR; INTRAVENOUS PRN
Status: DISCONTINUED | OUTPATIENT
Start: 2022-04-24 | End: 2022-04-24 | Stop reason: SDUPTHER

## 2022-04-24 RX ORDER — INDOCYANINE GREEN AND WATER 25 MG
KIT INJECTION
Status: COMPLETED
Start: 2022-04-24 | End: 2022-04-24

## 2022-04-24 RX ORDER — MORPHINE SULFATE 1 MG/ML
1 INJECTION, SOLUTION EPIDURAL; INTRATHECAL; INTRAVENOUS EVERY 5 MIN PRN
Status: DISCONTINUED | OUTPATIENT
Start: 2022-04-24 | End: 2022-04-24 | Stop reason: HOSPADM

## 2022-04-24 RX ORDER — MORPHINE SULFATE 4 MG/ML
4 INJECTION, SOLUTION INTRAMUSCULAR; INTRAVENOUS ONCE
Status: DISCONTINUED | OUTPATIENT
Start: 2022-04-24 | End: 2022-04-24 | Stop reason: HOSPADM

## 2022-04-24 RX ORDER — SODIUM CHLORIDE 0.9 % (FLUSH) 0.9 %
5-40 SYRINGE (ML) INJECTION EVERY 12 HOURS SCHEDULED
Status: DISCONTINUED | OUTPATIENT
Start: 2022-04-24 | End: 2022-04-24 | Stop reason: HOSPADM

## 2022-04-24 RX ORDER — ONDANSETRON 2 MG/ML
INJECTION INTRAMUSCULAR; INTRAVENOUS
Status: COMPLETED
Start: 2022-04-24 | End: 2022-04-24

## 2022-04-24 RX ORDER — INDOCYANINE GREEN AND WATER 25 MG
5 KIT INJECTION ONCE
Status: COMPLETED | OUTPATIENT
Start: 2022-04-24 | End: 2022-04-24

## 2022-04-24 RX ORDER — KETOROLAC TROMETHAMINE 30 MG/ML
INJECTION, SOLUTION INTRAMUSCULAR; INTRAVENOUS PRN
Status: DISCONTINUED | OUTPATIENT
Start: 2022-04-24 | End: 2022-04-24 | Stop reason: SDUPTHER

## 2022-04-24 RX ORDER — SODIUM CHLORIDE 9 MG/ML
25 INJECTION, SOLUTION INTRAVENOUS PRN
Status: DISCONTINUED | OUTPATIENT
Start: 2022-04-24 | End: 2022-04-24 | Stop reason: HOSPADM

## 2022-04-24 RX ORDER — ONDANSETRON 2 MG/ML
4 INJECTION INTRAMUSCULAR; INTRAVENOUS
Status: COMPLETED | OUTPATIENT
Start: 2022-04-24 | End: 2022-04-24

## 2022-04-24 RX ORDER — PROPOFOL 10 MG/ML
INJECTION, EMULSION INTRAVENOUS PRN
Status: DISCONTINUED | OUTPATIENT
Start: 2022-04-24 | End: 2022-04-24 | Stop reason: SDUPTHER

## 2022-04-24 RX ORDER — SODIUM CHLORIDE 0.9 % (FLUSH) 0.9 %
5-40 SYRINGE (ML) INJECTION PRN
Status: DISCONTINUED | OUTPATIENT
Start: 2022-04-24 | End: 2022-04-24 | Stop reason: HOSPADM

## 2022-04-24 RX ORDER — ONDANSETRON 2 MG/ML
INJECTION INTRAMUSCULAR; INTRAVENOUS PRN
Status: DISCONTINUED | OUTPATIENT
Start: 2022-04-24 | End: 2022-04-24 | Stop reason: SDUPTHER

## 2022-04-24 RX ORDER — DIPHENHYDRAMINE HYDROCHLORIDE 50 MG/ML
12.5 INJECTION INTRAMUSCULAR; INTRAVENOUS
Status: DISCONTINUED | OUTPATIENT
Start: 2022-04-24 | End: 2022-04-24 | Stop reason: HOSPADM

## 2022-04-24 RX ORDER — SCOLOPAMINE TRANSDERMAL SYSTEM 1 MG/1
PATCH, EXTENDED RELEASE TRANSDERMAL
Status: COMPLETED
Start: 2022-04-24 | End: 2022-04-24

## 2022-04-24 RX ORDER — NEOSTIGMINE METHYLSULFATE 5 MG/5 ML
SYRINGE (ML) INTRAVENOUS PRN
Status: DISCONTINUED | OUTPATIENT
Start: 2022-04-24 | End: 2022-04-24 | Stop reason: SDUPTHER

## 2022-04-24 RX ORDER — DOCUSATE SODIUM 100 MG/1
100 CAPSULE, LIQUID FILLED ORAL 2 TIMES DAILY
Qty: 20 CAPSULE | Refills: 0 | Status: SHIPPED | OUTPATIENT
Start: 2022-04-24 | End: 2022-07-05

## 2022-04-24 RX ORDER — FENTANYL CITRATE 50 UG/ML
INJECTION, SOLUTION INTRAMUSCULAR; INTRAVENOUS PRN
Status: DISCONTINUED | OUTPATIENT
Start: 2022-04-24 | End: 2022-04-24 | Stop reason: SDUPTHER

## 2022-04-24 RX ORDER — GLYCOPYRROLATE 1 MG/5 ML
SYRINGE (ML) INTRAVENOUS PRN
Status: DISCONTINUED | OUTPATIENT
Start: 2022-04-24 | End: 2022-04-24 | Stop reason: SDUPTHER

## 2022-04-24 RX ORDER — OXYCODONE HYDROCHLORIDE AND ACETAMINOPHEN 5; 325 MG/1; MG/1
TABLET ORAL
Status: COMPLETED
Start: 2022-04-24 | End: 2022-04-24

## 2022-04-24 RX ADMIN — INDOCYANINE GREEN AND WATER 5 MG: KIT at 14:20

## 2022-04-24 RX ADMIN — FENTANYL CITRATE 100 MCG: 50 INJECTION, SOLUTION INTRAMUSCULAR; INTRAVENOUS at 14:30

## 2022-04-24 RX ADMIN — SODIUM CHLORIDE, POTASSIUM CHLORIDE, SODIUM LACTATE AND CALCIUM CHLORIDE: 600; 310; 30; 20 INJECTION, SOLUTION INTRAVENOUS at 13:25

## 2022-04-24 RX ADMIN — OXYCODONE HYDROCHLORIDE AND ACETAMINOPHEN 1 TABLET: 5; 325 TABLET ORAL at 17:05

## 2022-04-24 RX ADMIN — DEXAMETHASONE SODIUM PHOSPHATE 8 MG: 10 INJECTION INTRAMUSCULAR; INTRAVENOUS at 14:40

## 2022-04-24 RX ADMIN — Medication 0.6 MG: at 15:46

## 2022-04-24 RX ADMIN — MORPHINE SULFATE 2 MG: 2 INJECTION, SOLUTION INTRAMUSCULAR; INTRAVENOUS at 16:07

## 2022-04-24 RX ADMIN — KETOROLAC TROMETHAMINE 30 MG: 30 INJECTION, SOLUTION INTRAMUSCULAR at 15:42

## 2022-04-24 RX ADMIN — Medication 3 MG: at 15:46

## 2022-04-24 RX ADMIN — ONDANSETRON 4 MG: 2 INJECTION INTRAMUSCULAR; INTRAVENOUS at 16:06

## 2022-04-24 RX ADMIN — ROCURONIUM BROMIDE 40 MG: 10 INJECTION INTRAVENOUS at 14:30

## 2022-04-24 RX ADMIN — PROPOFOL 150 MG: 10 INJECTION, EMULSION INTRAVENOUS at 14:30

## 2022-04-24 RX ADMIN — ONDANSETRON 4 MG: 2 INJECTION INTRAMUSCULAR; INTRAVENOUS at 15:39

## 2022-04-24 RX ADMIN — CEFAZOLIN SODIUM 2000 MG: 10 INJECTION, POWDER, FOR SOLUTION INTRAVENOUS at 14:41

## 2022-04-24 RX ADMIN — LIDOCAINE HYDROCHLORIDE 30 MG: 10 INJECTION, SOLUTION EPIDURAL; INFILTRATION; INTRACAUDAL; PERINEURAL at 14:30

## 2022-04-24 ASSESSMENT — PULMONARY FUNCTION TESTS
PIF_VALUE: 26
PIF_VALUE: 32
PIF_VALUE: 34
PIF_VALUE: 32
PIF_VALUE: 34
PIF_VALUE: 24
PIF_VALUE: 32
PIF_VALUE: 28
PIF_VALUE: 32
PIF_VALUE: 24
PIF_VALUE: 1
PIF_VALUE: 32
PIF_VALUE: 23
PIF_VALUE: 21
PIF_VALUE: 34
PIF_VALUE: 32
PIF_VALUE: 34
PIF_VALUE: 23
PIF_VALUE: 31
PIF_VALUE: 1
PIF_VALUE: 34
PIF_VALUE: 22
PIF_VALUE: 21
PIF_VALUE: 32
PIF_VALUE: 32
PIF_VALUE: 24
PIF_VALUE: 21
PIF_VALUE: 31
PIF_VALUE: 34
PIF_VALUE: 32
PIF_VALUE: 32
PIF_VALUE: 33
PIF_VALUE: 25
PIF_VALUE: 32
PIF_VALUE: 34
PIF_VALUE: 33
PIF_VALUE: 3
PIF_VALUE: 32
PIF_VALUE: 23
PIF_VALUE: 22
PIF_VALUE: 32
PIF_VALUE: 33
PIF_VALUE: 34
PIF_VALUE: 24
PIF_VALUE: 34
PIF_VALUE: 1
PIF_VALUE: 34
PIF_VALUE: 34
PIF_VALUE: 33
PIF_VALUE: 6
PIF_VALUE: 1
PIF_VALUE: 32
PIF_VALUE: 34
PIF_VALUE: 32
PIF_VALUE: 34
PIF_VALUE: 22
PIF_VALUE: 33
PIF_VALUE: 2
PIF_VALUE: 33
PIF_VALUE: 7
PIF_VALUE: 20
PIF_VALUE: 33
PIF_VALUE: 27
PIF_VALUE: 2
PIF_VALUE: 1
PIF_VALUE: 23
PIF_VALUE: 23
PIF_VALUE: 32
PIF_VALUE: 34
PIF_VALUE: 20
PIF_VALUE: 3
PIF_VALUE: 31
PIF_VALUE: 34
PIF_VALUE: 32
PIF_VALUE: 24
PIF_VALUE: 3
PIF_VALUE: 4
PIF_VALUE: 32
PIF_VALUE: 23
PIF_VALUE: 8
PIF_VALUE: 33
PIF_VALUE: 24
PIF_VALUE: 34
PIF_VALUE: 32
PIF_VALUE: 30
PIF_VALUE: 24
PIF_VALUE: 34

## 2022-04-24 ASSESSMENT — PAIN DESCRIPTION - FREQUENCY: FREQUENCY: CONTINUOUS

## 2022-04-24 ASSESSMENT — PAIN - FUNCTIONAL ASSESSMENT
PAIN_FUNCTIONAL_ASSESSMENT: 0-10
PAIN_FUNCTIONAL_ASSESSMENT: 0-10
PAIN_FUNCTIONAL_ASSESSMENT: PREVENTS OR INTERFERES SOME ACTIVE ACTIVITIES AND ADLS

## 2022-04-24 ASSESSMENT — PAIN DESCRIPTION - LOCATION
LOCATION: ABDOMEN

## 2022-04-24 ASSESSMENT — PAIN DESCRIPTION - DESCRIPTORS
DESCRIPTORS: BURNING;ACHING
DESCRIPTORS: BURNING
DESCRIPTORS: ACHING;BURNING;PRESSURE
DESCRIPTORS: SHARP
DESCRIPTORS: ACHING;BURNING;PRESSURE
DESCRIPTORS: BURNING;ACHING

## 2022-04-24 ASSESSMENT — PAIN SCALES - GENERAL
PAINLEVEL_OUTOF10: 4
PAINLEVEL_OUTOF10: 4
PAINLEVEL_OUTOF10: 3
PAINLEVEL_OUTOF10: 4
PAINLEVEL_OUTOF10: 5
PAINLEVEL_OUTOF10: 6
PAINLEVEL_OUTOF10: 4

## 2022-04-24 ASSESSMENT — PAIN DESCRIPTION - ORIENTATION: ORIENTATION: RIGHT

## 2022-04-24 ASSESSMENT — LIFESTYLE VARIABLES: HOW OFTEN DO YOU HAVE A DRINK CONTAINING ALCOHOL: NEVER

## 2022-04-24 ASSESSMENT — PAIN DESCRIPTION - PAIN TYPE: TYPE: ACUTE PAIN

## 2022-04-24 ASSESSMENT — PAIN DESCRIPTION - ONSET: ONSET: SUDDEN

## 2022-04-24 NOTE — ANESTHESIA POSTPROCEDURE EVALUATION
POST- ANESTHESIA EVALUATION       Pt Name: Dona Randolph  MRN: [id number]  YOB: 1995  Date of evaluation: 4/24/2022  Time:  4:04 PM      BP (!) 130/90   Pulse 73   Temp 97.9 °F (36.6 °C) (Skin)   Resp 14   Ht 5' 2\" (1.575 m)   Wt 183 lb 1.1 oz (83 kg)   SpO2 100%   BMI 33.48 kg/m²      Consciousness Level  Awake  Cardiopulmonary Status  Stable  Pain Adequately Treated YES  Nausea / Vomiting  NO  Adequate Hydration  YES  Anesthesia Related Complications NONE      Electronically signed by Baldev Lopez MD on 4/24/2022 at 4:04 PM       Department of Anesthesiology  Postprocedure Note    Patient: Dona Randolph  MRN: [id number]  YOB: 1995  Date of evaluation: 4/24/2022  Time:  4:04 PM     Procedure Summary     Date: 04/24/22 Room / Location: [address]    Anesthesia Start: 2667 Anesthesia Stop: 1559    Procedure: CHOLECYSTECTOMY LAPAROSCOPIC ROBOTIC (N/A ) Diagnosis: (Abdominal Pain)    Surgeons: Ludwig Sparks DO Responsible Provider: Baldev Lopez MD    Anesthesia Type: general ASA Status: 2 - Emergent          Anesthesia Type: general    Pritesh Phase I: Pritesh Score: 10    Pritesh Phase II:      Last vitals: Reviewed and per EMR flowsheets.        Anesthesia Post Evaluation

## 2022-04-24 NOTE — ED PROVIDER NOTES
[address]      Pt Name: Yecenia Urena  MRN: [id number]  Shayygfjuan francisco 1995  Date of evaluation: 2022      CHIEF COMPLAINT       Chief Complaint   Patient presents with    Abdominal Pain     patient has a history of gallstones that was diagnosed in late term pregnancy          HISTORY OF PRESENT ILLNESS      The patient presents with right upper quadrant abdominal pain. She has a history of gallstones diagnosed while she was pregnant. She is 3 weeks postpartum. During her pregnancy since she was delivering at [facility], she saw a surgeon there, but would like to see DrMichael House at [facility]. The patient says that 3 days ago her pain started getting worse. The pain was improved with Motrin 3 days ago, better with oxycodone yesterday, but today nothing is helping. She is having some nausea as well. She denies diarrhea. Nothing makes her symptoms better or worse otherwise. The patient had a  without complication. The patient is nursing but is pumping and dumping due to her oxycodone consumption. REVIEW OF SYSTEMS       All systems reviewed and negative unless noted in HPI. The patient denies fever or constitutional symptoms. Denies vision change. Denies any sore throat or rhinorrhea. Denies any neck pain or stiffness. Denies chest pain or shortness of breath. Nausea and right upper quadrant pain. History of gallstones. Denies any dysuria. Denies urinary frequency or hematuria. Denies musculoskeletal injury or pain. Denies any weakness, numbness or focal neurologic deficit. Denies any skin rash or edema. No recent psychiatric issues. No easy bruising or bleeding. Denies any polyuria, polydypsia or history of immunocompromise. PAST MEDICAL HISTORY    has no past medical history on file. SURGICAL HISTORY      has a past surgical history that includes  section.     CURRENT MEDICATIONS Previous Medications    PRENATAL MV-MIN-FE FUM-FA-DHA (PRENATAL 1 PO)    Take by mouth daily       ALLERGIES     has No Known Allergies. FAMILY HISTORY     She indicated that her mother is alive. She indicated that her father is alive. She indicated that the status of her paternal grandmother is unknown. She indicated that the status of her neg hx is unknown.     family history includes Diabetes in her paternal grandmother. SOCIAL HISTORY      reports that she has never smoked. She has never used smokeless tobacco. She reports that she does not use drugs. PHYSICAL EXAM     INITIAL VITALS:  height is 5' 2\" (1.575 m) and weight is 83 kg (183 lb 1.1 oz). Her oral temperature is 98 °F (36.7 °C). Her blood pressure is 94/76 and her pulse is 80. Her respiration is 14 and oxygen saturation is 99%. The patient is alert and oriented, in no apparent distress. HEENT is atraumatic. Pupils are PERRL at 4 mm. Mucous membranes moist.    Neck is supple. Heart sounds regular rate and rhythm with no gallops, murmurs, or rubs. Lungs clear, no wheezes, rales or rhonchi. Abdomen: soft, mild tenderness in right upper quadrant.  incision site is healing nicely. Musculoskeletal exam shows no evidence of trauma. Normal distal pulses in all extremities. Skin: no rash or edema. Neurological exam reveals cranial nerves 2 through 12 grossly intact. Patient has equal  and normal deep tendon reflexes. Psychiatric: Appropriate. Lymphatics.:  No lymphadenopathy.          DIFFERENTIAL DIAGNOSIS/ MDM:     Cholecystitis, cholelithiasis    DIAGNOSTIC RESULTS         LABS:  Results for orders placed or performed during the hospital encounter of 22   CBC with Auto Differential   Result Value Ref Range    WBC 7.1 3.5 - 11.0 k/uL    RBC 5.02 4.0 - 5.2 m/uL    Hemoglobin 12.9 12.0 - 16.0 g/dL    Hematocrit 39.5 36 - 46 %    MCV 78.7 (L) 80 - 100 fL    MCH 25.7 (L) 26 - 34 pg    MCHC 32.7 31 - 37 g/dL    RDW 16.4 (H) 12.5 - 15.4 %    Platelets 503 021 - 956 k/uL    MPV 8.1 6.0 - 12.0 fL    Seg Neutrophils 39 36 - 66 %    Lymphocytes 49 (H) 24 - 44 %    Monocytes 6 2 - 11 %    Eosinophils % 5 (H) 1 - 4 %    Basophils 1 0 - 2 %    Segs Absolute 2.80 1.8 - 7.7 k/uL    Absolute Lymph # 3.60 1.0 - 4.8 k/uL    Absolute Mono # 0.40 0.1 - 1.2 k/uL    Absolute Eos # 0.30 0.0 - 0.4 k/uL    Basophils Absolute 0.00 0.0 - 0.2 k/uL   Basic Metabolic Panel   Result Value Ref Range    Glucose 85 70 - 99 mg/dL    BUN 12 6 - 20 mg/dL    CREATININE 0.55 0.50 - 0.90 mg/dL    Calcium 10.2 8.6 - 10.4 mg/dL    Sodium 135 135 - 144 mmol/L    Potassium 4.0 3.7 - 5.3 mmol/L    Chloride 99 98 - 107 mmol/L    CO2 23 20 - 31 mmol/L    Anion Gap 13 9 - 17 mmol/L    GFR Non-African American >60 >60 mL/min    GFR African American >60 >60 mL/min    GFR Comment         Hepatic Function Panel   Result Value Ref Range    Albumin 4.2 3.5 - 5.2 g/dL    Alkaline Phosphatase 252 (H) 35 - 104 U/L    ALT 17 5 - 33 U/L    AST 19 <32 U/L    Total Bilirubin 0.56 0.3 - 1.2 mg/dL    Bilirubin, Direct 0.09 <0.31 mg/dL    Bilirubin, Indirect 0.47 0.00 - 1.00 mg/dL    Total Protein 7.6 6.4 - 8.3 g/dL    Albumin/Globulin Ratio 1.2 1.0 - 2.5   Lipase   Result Value Ref Range    Lipase 55 13 - 60 U/L   Amylase   Result Value Ref Range    Amylase 93 28 - 100 U/L         EMERGENCY DEPARTMENT COURSE:   Vitals:    Vitals:    04/24/22 1009 04/24/22 1200   BP: 113/84 94/76   Pulse: 88 80   Resp: 14 14   Temp: 98 °F (36.7 °C)    TempSrc: Oral    SpO2: 100% 99%   Weight: 83 kg (183 lb 1.1 oz)    Height: 5' 2\" (1.575 m)      -------------------------  BP: 94/76, Temp: 98 °F (36.7 °C), Pulse: 80, Resp: 14      Re-evaluation Notes    Dr. Sera Grimes, the patient's surgeon, has seen the patient in the emergency department. He will be taking her to the operating room today. The patient is admitted in stable condition. CONSULTS:    8035  Discussed with Dr. Sera Grimes.   Will see pt in ED in 20 minutes. FINAL IMPRESSION      1. Cholecystitis          DISPOSITION/PLAN   DISPOSITION        Condition on Disposition    Stable    PATIENT REFERRED TO:  No follow-up provider specified.     DISCHARGE MEDICATIONS:  New Prescriptions    No medications on file       (Please note that portions of this note were completed with a voice recognition program.  Efforts were made to edit the dictations but occasionally words are mis-transcribed.)    Mary Alice Milligan MD,, MD   Attending Emergency Physician         Gricelda Friend MD  04/26/22 2848

## 2022-04-24 NOTE — OP NOTE
Operative Note      Patient: Nichole Biggs  YOB: 1995  MRN: [id number]    Date of Procedure: 4/24/2022    Pre-Op Diagnosis: Abdominal Pain    Post-Op Diagnosis:   Acute cholecystitis secondary to cystic duct obstruction by stone       Procedure(s):  CHOLECYSTECTOMY LAPAROSCOPIC ROBOTIC   Laparoscopic transcystic common duct exploration    Surgeon(s):  Parvez Howe DO    Assistant:   * No surgical staff found *    Anesthesia: General    Estimated Blood Loss (mL): 90JZ    Complications: None    Specimens:   ID Type Source Tests Collected by Time Destination   A : GALLBLADDER Tissue Tissue SURGICAL PATHOLOGY Parvez Howe DO 4/24/2022 1536        Implants:  * No implants in log *      Drains: * No LDAs found *    Findings: as above. Wound class 2    Detailed Description of Procedure:         HISTORY: The patient is a 32y.o. year old female with history of above preop diagnosis. The risk, benefits, expected outcome, and alternatives to the procedure were explained to the patient's understanding and written informed consent was obtained. DESCRIPTION OF PROCEDURE:  Patient was brought to the operating suite and placed on the operating table in supine position. Timeout was performed verifying correct patient, position, equipment and procedure to be performed. EPC cuffs were applied and preoperative antibiotics were infused. General anesthesia administered and the patient was endotracheally intubated. The patient's abdomen was prepped and draped in the usual sterile fashion. Scalpel was used to make a transverse incision 1cm at Thao's point, 8mm Optiview trocar was used to gain entry into the abdomen under direct visualization, no injury to the underlying structures were seen. 1cm transverse incisions were then made below  the umbilicus and two additional incisions in the RLQ at the midclavicular and anterior axillary lines. 8mm robot ports were placed under direct visualization at these sites. Pneumoperitoneum established with 15mmHg CO2. The patient was then positioned in reverse Trendelenburg with rotation the patient's left, then the robot was docked in the usual fashion. The gallbladder appeared acutely inflamed with pericholecystic edema consistent with acute cholecystitis. Atraumatic graspers were used to grasp and elevate the fundus and infundibulum. Adhesions between the gallbladder and omentum were dissected away using blunt dissection as well as with electrocautery. The peritoneum between the gallbladder and liver were taken down with electrocautery on the medial and lateral aspects. Due to the acute inflammatory reaction at the infundibulum, benefits of achieving critical view of safety was outweighed by its risks. The cystic artery was positively identified as entering only the gallbladder, this was clipped proximally then transected with scissors, there was minimal backflow of arterial blood. The cystic duct was skeletonized at the infundibulum, firefly was used to illuminate the ICG administered preoperatively, only the proximal portion of the cystic duct was illuminated at its takeoff from the common bile duct. The cystic duct at the infundibulum was clipped then the cystic duct was transected proximal to this, there was flow of hydroptic fluid without flow of bile. The cystic duct was carefully and meticulously opened with scissors until a gallstone was encountered, this was removed and backflow of clear bile containing ICG was seen from the common bile duct. Hemostasis was achieved and maintained with cautery. Firefly was used at multiple points ensure that the common bile duct was kept safely away from the area of dissection. 3-0 absorbable v-jamie suture was used to close the cystic duct, the needle was kept visible the entirety of the closure to ensure that only the cystic duct was closed. The adjacent peritoneal tissue was used to cover the cystic duct closure for added safety. Firefly was again used, there was no leakage of bile noted. The remainder of the gallbladder was dissected off of the gallbladder fossa, hemostasis was excellent. Gallbladder and the retrieved stone were removed by specimen back through the LUQ port. The RUQ was thoroughly irrigated with sterile saline until the effluent was clear. The working ports were removed under direct visualization, the camera and camera port were then removed and the abdomen dessuflated. The port sites were locally anesthetized with a total of 40cc of 1% lidocaine and 0.25% marcaine without epinephrine. The port sites were then closed with subcuticular sutures of 4-0 monocryl then covered with mastisol/steri-strips    The patient tolerated the procedure well without complications, all sponge needle and instrument counts were correct at the end of the case. The patient was successfully extubated and taken to PACU in stable condition.       Electronically signed by Abel Riedel, DO on 4/24/2022 at 3:57 PM

## 2022-04-24 NOTE — H&P
[address]      Patient's Name/ Date of Birth/ Gender: Shahnaz Robbins / 1995 (32 y.o.) / female     Attending physician: Pamella Courtney MD    CC: symptomatic cholelithiasis, concern for acute cholecystitis    History of present Illness: Shahnaz Robbins is a 32 y.o. female, known to my service, seen in 2022 for symptomatic cholelithiasis, she is 3 mo postpartum from  section for elevated liver enzymes. Pt has experienced persistent and worsening RUQ pain for the past 3 days, pain medications no longer control her symptoms. Denies jaundice or other complaints. Her mother is also present. Past Medical History:  has no past medical history on file. Past Surgical History:   Past Surgical History:   Procedure Laterality Date     SECTION         Social History:  reports that she has never smoked. She has never used smokeless tobacco. She reports that she does not use drugs. Family History: family history includes Diabetes in her paternal grandmother. Review of Systems:   General: Denies fever, chills, night sweats, weight loss, malaise, fatigue  HEENT: Denies sore throat, sinus problems, allergic rhinosinusitis  Card: Denies chest pain, palpitations, orthopnea/PND. Denies h/o murmurs  Pulm: Denies cough, shortness of breath, DAY  GI:  RUQ pain    : Denies polyuria, dysuria, hematuria  Endo: Denies diabetes, thyroid problems. Heme: Denies anemia, h/o bleeding or clotting problems. Neuro: Denies h/o CVA, TIA  Skin: Denies rashes, ulcers  Musculoskeletal: Denies muscle, joint, back pain. Allergies: Patient has no known allergies.     Current Meds:  Current Facility-Administered Medications:     morphine injection 4 mg, 4 mg, IntraVENous, Once, Pamella Courtney MD    Current Outpatient Medications:     Prenatal MV-Min-Fe Fum-FA-DHA (PRENATAL 1 PO), Take by mouth daily, Disp: , Rfl:     Vital Signs:  Vitals:    22 1200 BP: 94/76   Pulse: 80   Resp: 14   Temp:    SpO2: 99%       Physical Exam:  Vital signs and Nurse's note reviewed  Gen:  A&Ox3, NAD  HEENT: NCAT, PERRLA, EOMI, no scleral icterus, oral mucosa moist  Neck: Trachea midline without obvious masses or lesions  Chest: Symmetric rise with inhalation, no evidence of trauma  CVS: S1S2  Resp: Good bilateral air entry, no audible wheeze or rhonchi  Abd: soft, RUQ tenderness. Ext: No clubbing, cyanosis, edema, peripheral pulses 2+ Rad/Fem/DP/PT  CNS: Moves all extremities, no gross focal motor deficits  Skin: No erythema or ulcerations     Labs:   CBC:   Recent Labs     04/24/22  1015   WBC 7.1   HGB 12.9        BMP:    Recent Labs     04/24/22  1152      K 4.0   CL 99   CO2 23   BUN 12   CREATININE 0.55   GLUCOSE 85     Hepatic:   Recent Labs     04/24/22  1152   AST 19   ALT 17   ALKPHOS 252*   BILITOT 0.56   BILIDIR 0.09   LIPASE 55   AMYLASE 93     Coagulation:   Recent Labs     04/24/22  1152   PROT 7.6         Assessment:    Khadijah Peralta is a 32 y.o. female with     Symptomatic cholelithiasis, concern for evolving acute cholecystitis    Plan:    1.  To OR for cholecystectomy, all questions answered, written informed consent obtained      Mayela Rojo DO  4/24/2022

## 2022-04-24 NOTE — ED NOTES
Patient states that she would prefer to not be given the Morphine for pain at this time. Patient stated that she does not like the way that it make her feels. She is currently rating her pain as a 4 and is able to tolerate it at this time.      Alma Rosa Poewrs RN  04/24/22 7242

## 2022-04-24 NOTE — ANESTHESIA PRE PROCEDURE
Department of Anesthesiology  Preprocedure Note       Name:  Isis Colorado   Age:  32 y.o.  :  1995                                          MRN:  [id number]         Date:  2022      Surgeon: Renetta Chaves):  Sherrie Deng DO    Procedure: Procedure(s):  CHOLECYSTECTOMY LAPAROSCOPIC ROBOTIC    Medications prior to admission:   Prior to Admission medications    Medication Sig Start Date End Date Taking? Authorizing Provider   Prenatal MV-Min-Fe Fum-FA-DHA (PRENATAL 1 PO) Take by mouth daily    Historical Provider, MD       Current medications:    Current Facility-Administered Medications   Medication Dose Route Frequency Provider Last Rate Last Admin    morphine injection 4 mg  4 mg IntraVENous Once Tamar Sorto MD         Current Outpatient Medications   Medication Sig Dispense Refill    Prenatal MV-Min-Fe Fum-FA-DHA (PRENATAL 1 PO) Take by mouth daily         Allergies:  No Known Allergies    Problem List:    Patient Active Problem List   Diagnosis Code    Vitamin D deficiency E55.9    Chronic bilateral low back pain with bilateral sciatica M54.42, M54.41, G89.29    Abdominal cramping affecting pregnancy O26.899, R10.9    History of recurrent UTIs Z87.440    Normal pregnancy in first trimester Z34.91    Obesity affecting pregnancy in second trimester O99.212    Class 1 obesity due to excess calories without serious comorbidity with body mass index (BMI) of 33.0 to 33.9 in adult E66.09, Z68.33       Past Medical History:  No past medical history on file.     Past Surgical History:        Procedure Laterality Date     SECTION         Social History:    Social History     Tobacco Use    Smoking status: Never Smoker    Smokeless tobacco: Never Used   Substance Use Topics    Alcohol use: Not on file                                Counseling given: Not Answered      Vital Signs (Current):   Vitals:    22 1009 22 1200   BP: 113/84 94/76   Pulse: 88 80   Resp: 14 14   Temp: 98 °F (36.7 °C)    TempSrc: Oral    SpO2: 100% 99%   Weight: 183 lb 1.1 oz (83 kg)    Height: 5' 2\" (1.575 m)                                               BP Readings from Last 3 Encounters:   04/24/22 94/76   12/02/21 90/72   11/24/20 104/82       NPO Status:  after MN                                                                               BMI:   Wt Readings from Last 3 Encounters:   04/24/22 183 lb 1.1 oz (83 kg)   01/03/22 185 lb (83.9 kg)   12/02/21 185 lb 9.6 oz (84.2 kg)     Body mass index is 33.48 kg/m². CBC:   Lab Results   Component Value Date    WBC 7.1 04/24/2022    RBC 5.02 04/24/2022    RBC 4.60 08/02/2018    HGB 12.9 04/24/2022    HCT 39.5 04/24/2022    MCV 78.7 04/24/2022    RDW 16.4 04/24/2022     04/24/2022       CMP:   Lab Results   Component Value Date     04/24/2022    K 4.0 04/24/2022    CL 99 04/24/2022    CO2 23 04/24/2022    BUN 12 04/24/2022    CREATININE 0.55 04/24/2022    GFRAA >60 04/24/2022    LABGLOM >60 04/24/2022    GLUCOSE 85 04/24/2022    GLUCOSE 97 08/02/2018    PROT 7.6 04/24/2022    CALCIUM 10.2 04/24/2022    BILITOT 0.56 04/24/2022    ALKPHOS 252 04/24/2022    AST 19 04/24/2022    ALT 17 04/24/2022       POC Tests: No results for input(s): POCGLU, POCNA, POCK, POCCL, POCBUN, POCHEMO, POCHCT in the last 72 hours.     Coags: No results found for: PROTIME, INR, APTT    HCG (If Applicable):   Lab Results   Component Value Date    PREGTESTUR Positive 07/17/2018    HCGQUANT 5,911 (H) 07/26/2018        ABGs: No results found for: PHART, PO2ART, ESH4MOO, RMU8EVA, BEART, Y6PETAQD     Type & Screen (If Applicable):  No results found for: LABABO, LABRH    Drug/Infectious Status (If Applicable):  No results found for: HIV, HEPCAB    COVID-19 Screening (If Applicable):   Lab Results   Component Value Date    COVID19 Not Detected 12/22/2020           Anesthesia Evaluation  Patient summary reviewed and Nursing notes reviewed no history of anesthetic complications:   Airway: Mallampati: II  TM distance: >3 FB   Neck ROM: full  Mouth opening: > = 3 FB Dental: normal exam         Pulmonary:Negative Pulmonary ROS and normal exam  breath sounds clear to auscultation                             Cardiovascular:Negative CV ROS            Rhythm: regular  Rate: normal                    Neuro/Psych:   (+) neuromuscular disease:,             GI/Hepatic/Renal:            ROS comment: Abdominal Pain. Endo/Other: Negative Endo/Other ROS                    Abdominal:       Abdomen: soft and tender. Vascular: negative vascular ROS. Other Findings:             Anesthesia Plan      general     ASA 2 - emergent     (Patient just had a baby within 3 weeks, no need to do HCG test per ER physician)  Induction: intravenous. MIPS: Postoperative opioids intended and Prophylactic antiemetics administered. Anesthetic plan and risks discussed with patient.                       Hung Triana MD   4/24/2022

## 2022-04-25 ENCOUNTER — TELEPHONE (OUTPATIENT)
Dept: SURGERY | Age: 27
End: 2022-04-25

## 2022-04-25 NOTE — TELEPHONE ENCOUNTER
4/25/2022- Patients new visit appt for 4/25 was cancelled because patient had surgery with Dr. Jeremiah Vang over the weekend. A two week post op appt is scheduled for 5/9 at 1:30pm. Writer CHIQUI on patients phone with this information.

## 2022-04-26 LAB — SURGICAL PATHOLOGY REPORT: NORMAL

## 2022-05-09 ENCOUNTER — OFFICE VISIT (OUTPATIENT)
Dept: SURGERY | Age: 27
End: 2022-05-09

## 2022-05-09 VITALS — HEIGHT: 62 IN | WEIGHT: 165.34 LBS | BODY MASS INDEX: 30.43 KG/M2 | OXYGEN SATURATION: 99 % | HEART RATE: 108 BPM

## 2022-05-09 DIAGNOSIS — Z90.49 STATUS POST LAPAROSCOPIC CHOLECYSTECTOMY: Primary | ICD-10-CM

## 2022-05-09 PROCEDURE — 99024 POSTOP FOLLOW-UP VISIT: CPT | Performed by: SURGERY

## 2022-05-09 NOTE — PROGRESS NOTES
[facility]  Progress Note    PATIENT NAME: Hermilo Anthony     CLINIC VISIT DATE: 5/9/2022    SUBJECTIVE:  Hermilo Anthony is a 32 y.o. female who presents to the clinic today for follow up to robot gregorio performed 4/24/22. No new issues since surgery, pt is tolerating regular diet and having normal BM. Pathology as follows:    -- Diagnosis --   Gallbladder, laparoscopic cholecystectomy:   Chronic cholecystitis. Cholesterolosis. Cholelithiasis. OBJECTIVE:    Pulse 108   Ht 5' 2\" (1.575 m)   Wt 165 lb 5.5 oz (75 kg)   SpO2 99%   BMI 30.24 kg/m²     General Appearance:  awake, alert, no acute distress, well developed, well nourished   Skin:  Skin color, texture, turgor normal. No rashes or lesions. Lungs:  Normal chest expansion, unlabored breathing without accessory muscle use. No audible rales, rhonchi, or wheezing. Cardiovascular: S1S2. No evidence of JVD. No evidence of pulsatile masses in abdomen  Abdomen:  Soft, non-tender, no organomegaly, no masses. Incisions C/D/I without evidence of bleeding/infection  Musculoskeletal: No evidence of bony/muscular deformities, trauma, atrophy of either left/right upper/lower extremity. No evidence of digital clubbing or cyanosis. ASSESSMENT:  1. Status post laparoscopic cholecystectomy          PLAN:  1. Lifting restriction to less than 20lbs for the next 4 weeks, unrestricted after this.   2. F/U prn    Electronically signed by Scotty Zaldivar DO on 5/9/2022 at 1:55 PM

## 2022-05-09 NOTE — LETTER
[facility]  Surgical Specialties - General Surgery  [address]  [place], [facility] [address]  Phone: [phone]  Fax: [phone]      22    Patient: Ekta Jiménez  MRN: [id number]  : 1995  Date of visit: 2022    Dear Jayme Apley, JORY - CNP:      I saw Ekta Jiménez in follow up to robot cholecystectomy done 22, she is doing well. Below are the relevant portions of my assessment and plan of care. If you have questions, please do not hesitate to call me. I look forward to following Ekta Jiménez along with you.     Sincerely,        Tushar Matos, DO

## 2022-07-05 ENCOUNTER — OFFICE VISIT (OUTPATIENT)
Dept: PRIMARY CARE CLINIC | Age: 27
End: 2022-07-05
Payer: MEDICARE

## 2022-07-05 VITALS
HEART RATE: 84 BPM | HEIGHT: 63 IN | SYSTOLIC BLOOD PRESSURE: 102 MMHG | RESPIRATION RATE: 14 BRPM | BODY MASS INDEX: 32.67 KG/M2 | DIASTOLIC BLOOD PRESSURE: 70 MMHG | OXYGEN SATURATION: 99 % | WEIGHT: 184.4 LBS

## 2022-07-05 DIAGNOSIS — R79.89 ELEVATED LFTS: ICD-10-CM

## 2022-07-05 DIAGNOSIS — F41.9 ANXIETY: ICD-10-CM

## 2022-07-05 DIAGNOSIS — K80.20 GALLSTONES: ICD-10-CM

## 2022-07-05 DIAGNOSIS — D50.9 IRON DEFICIENCY ANEMIA, UNSPECIFIED IRON DEFICIENCY ANEMIA TYPE: ICD-10-CM

## 2022-07-05 DIAGNOSIS — Z00.00 ANNUAL PHYSICAL EXAM: Primary | ICD-10-CM

## 2022-07-05 PROCEDURE — 99395 PREV VISIT EST AGE 18-39: CPT | Performed by: NURSE PRACTITIONER

## 2022-07-05 ASSESSMENT — PATIENT HEALTH QUESTIONNAIRE - PHQ9
4. FEELING TIRED OR HAVING LITTLE ENERGY: 0
7. TROUBLE CONCENTRATING ON THINGS, SUCH AS READING THE NEWSPAPER OR WATCHING TELEVISION: 0
10. IF YOU CHECKED OFF ANY PROBLEMS, HOW DIFFICULT HAVE THESE PROBLEMS MADE IT FOR YOU TO DO YOUR WORK, TAKE CARE OF THINGS AT HOME, OR GET ALONG WITH OTHER PEOPLE: 0
1. LITTLE INTEREST OR PLEASURE IN DOING THINGS: 0
SUM OF ALL RESPONSES TO PHQ QUESTIONS 1-9: 0
2. FEELING DOWN, DEPRESSED OR HOPELESS: 0
SUM OF ALL RESPONSES TO PHQ QUESTIONS 1-9: 0
9. THOUGHTS THAT YOU WOULD BE BETTER OFF DEAD, OR OF HURTING YOURSELF: 0
SUM OF ALL RESPONSES TO PHQ QUESTIONS 1-9: 0
8. MOVING OR SPEAKING SO SLOWLY THAT OTHER PEOPLE COULD HAVE NOTICED. OR THE OPPOSITE, BEING SO FIGETY OR RESTLESS THAT YOU HAVE BEEN MOVING AROUND A LOT MORE THAN USUAL: 0
6. FEELING BAD ABOUT YOURSELF - OR THAT YOU ARE A FAILURE OR HAVE LET YOURSELF OR YOUR FAMILY DOWN: 0
SUM OF ALL RESPONSES TO PHQ9 QUESTIONS 1 & 2: 0
3. TROUBLE FALLING OR STAYING ASLEEP: 0
5. POOR APPETITE OR OVEREATING: 0
SUM OF ALL RESPONSES TO PHQ QUESTIONS 1-9: 0

## 2022-07-05 NOTE — PROGRESS NOTES
[address] PRIMARY CARE  [address] [facility] [address]  [address] [address]  Dept: [phone]  Dept Fax: [phone]    Myranda Alva is a 32 y.o. female who presents today for her medical conditions/complaintsas noted below. Myranda Alva is c/o of Annual Exam (check up before traveling, going to [facility] for 10 months)        HPI:     Patient presents for her annual physical  Blood pressure stable  Weight is stable    Patient presents for her annual physical.  She had a baby 3 months ago. Baby boy. breastfeeding some. He also gets formula. She is adjusting well to having 2 children. Her daughter is with her today. Feeling a lot better since having her gallbladder done. She had her gallbladder removed 3 weeks after surgery. She is recovering well. Going to [place] for 10 months. Leaving . She does complain of some travel anxiety. She is wondering if there is anything that she can take. She will have one 8-hour flight and a 4-hour flight. She is increasingly anxious now that she has 2 children. She has never taken anything before    She is otherwise doing well and denies any new concerns      History reviewed. No pertinent past medical history.    Past Surgical History:   Procedure Laterality Date     SECTION      CHOLECYSTECTOMY  2022    laparoscopic:robotic    CHOLECYSTECTOMY, LAPAROSCOPIC N/A 2022    CHOLECYSTECTOMY [facility]AROSCOPIC [facility]OT[facility] [address] [facility]       Family History   Problem Relation Age of Onset    Diabetes Paternal Grandmother     Breast Cancer Neg Hx     Colon Cancer Neg Hx     Eclampsia Neg Hx     Hypertension Neg Hx     Ovarian Cancer Neg Hx      Labor Neg Hx     Spont Abortions Neg Hx     Stroke Neg Hx     Cancer Neg Hx        Social History     Tobacco Use    Smoking status: Never Smoker    Smokeless tobacco: Never Used   Substance Use Topics    Alcohol use: Not on file      Current Outpatient Medications   Medication Sig Dispense Refill    ALPRAZolam (XANAX) 0.25 MG tablet Take 1 tablet by mouth 2 times daily as needed for Anxiety for up to 3 days. 6 tablet 0    Prenatal MV-Min-Fe Fum-FA-DHA (PRENATAL 1 PO) Take by mouth daily        No current facility-administered medications for this visit. No Known Allergies    Health Maintenance   Topic Date Due    Varicella vaccine (1 of 2 - 2-dose childhood series) Never done    COVID-19 Vaccine (1) Never done    DTaP/Tdap/Td vaccine (1 - Tdap) 03/14/2012    Pap smear  Never done    Flu vaccine (1) 09/01/2022    Depression Monitoring  07/05/2023    Hepatitis C screen  Completed    HIV screen  Completed    Hepatitis A vaccine  Aged Out    Hepatitis B vaccine  Aged Out    Hib vaccine  Aged Out    Meningococcal (ACWY) vaccine  Aged Out    Pneumococcal 0-64 years Vaccine  Aged Out       :     Review of Systems   Constitutional: Negative for chills, fatigue and fever. HENT: Negative for ear discharge, ear pain, sinus pressure, sinus pain, sore throat and trouble swallowing. Eyes: Negative for discharge, redness and itching. Respiratory: Negative for cough, chest tightness, shortness of breath and wheezing. Cardiovascular: Negative for chest pain. Gastrointestinal: Negative for abdominal pain, diarrhea, nausea and vomiting. Genitourinary: Negative for difficulty urinating. Musculoskeletal: Negative for arthralgias and neck pain. Skin: Negative for rash. Neurological: Negative for dizziness, weakness, light-headedness and headaches. All other systems reviewed and are negative. Objective:     Physical Exam  Constitutional:       Appearance: Normal appearance. She is normal weight. HENT:      Head: Normocephalic and atraumatic. Nose: Nose normal.   Eyes:      Extraocular Movements: Extraocular movements intact.       Conjunctiva/sclera: Conjunctivae normal.      Pupils: Pupils are equal, round, and reactive to light. Cardiovascular:      Rate and Rhythm: Normal rate and regular rhythm. Pulses: Normal pulses. Heart sounds: Normal heart sounds. Pulmonary:      Effort: Pulmonary effort is normal.      Breath sounds: Normal breath sounds. Abdominal:      General: Abdomen is flat. Palpations: Abdomen is soft. Musculoskeletal:         General: Normal range of motion. Cervical back: Neck supple. Skin:     General: Skin is warm and dry. Capillary Refill: Capillary refill takes less than 2 seconds. Neurological:      General: No focal deficit present. Mental Status: She is alert and oriented to person, place, and time. Psychiatric:         Mood and Affect: Mood normal.       /70   Pulse 84   Resp 14   Ht 5' 2.5\" (1.588 m) Comment: Pt reported  Wt 184 lb 6.4 oz (83.6 kg)   SpO2 99%   Breastfeeding No   BMI 33.19 kg/m²     Assessment:       Diagnosis Orders   1. Annual physical exam     2. Iron deficiency anemia, unspecified iron deficiency anemia type  CBC    Iron and TIBC    Ferritin   3. Elevated LFTs  Comprehensive Metabolic Panel   4. Anxiety  ALPRAZolam (XANAX) 0.25 MG tablet   5. Gallstones         :      Return if symptoms worsen or fail to improve. 1.  Annual physical exam  -We will repeat some lab work  2. Iron deficiency anemia  -Plan to check iron studies and CBC  3. Elevated LFTs  -Patient is status post gallbladder removal.  She was induced early due to her elevated liver enzymes. We will repeat lab work now that she is postop  4. Anxiety  -Rx for Xanax 0.25 mg twice daily as needed. She is to try taking 1 dose at home prior to traveling to see if this helps calm her down for her flight  5. Gallstones  -Status postcholecystectomy. Symptoms have resolved    Patient is to follow-up after she returns from her trip.   She is to get her lab work done before leaving  Orders Placed This Encounter   Procedures    CBC     Standing Status:   Future     Standing Expiration Date:   7/5/2023    Comprehensive Metabolic Panel     Standing Status:   Future     Standing Expiration Date:   7/5/2023    Iron and TIBC     Standing Status:   Future     Standing Expiration Date:   7/5/2023    Ferritin     Standing Status:   Future     Standing Expiration Date:   7/5/2023     Orders Placed This Encounter   Medications    ALPRAZolam (XANAX) 0.25 MG tablet     Sig: Take 1 tablet by mouth 2 times daily as needed for Anxiety for up to 3 days. Dispense:  6 tablet     Refill:  0       Patient given educational materials - seepatient instructions. Discussed use, benefit, and side effects of prescribed medications. All patient questions answered. Pt voiced understanding. Reviewed health maintenance. Instructed to continue current medications, diet and exercise. Patient agreedwith treatment plan. Follow up as directed.       Electronically signed by JORY Moses CNP on 7/6/2022at 2:19 PM

## 2022-07-06 RX ORDER — ALPRAZOLAM 0.25 MG/1
0.25 TABLET ORAL 2 TIMES DAILY PRN
Qty: 6 TABLET | Refills: 0 | Status: SHIPPED | OUTPATIENT
Start: 2022-07-06 | End: 2022-07-09

## 2022-07-06 ASSESSMENT — ENCOUNTER SYMPTOMS
SINUS PRESSURE: 0
EYE REDNESS: 0
COUGH: 0
CHEST TIGHTNESS: 0
EYE DISCHARGE: 0
SHORTNESS OF BREATH: 0
ABDOMINAL PAIN: 0
DIARRHEA: 0
WHEEZING: 0
NAUSEA: 0
SINUS PAIN: 0
SORE THROAT: 0
TROUBLE SWALLOWING: 0
EYE ITCHING: 0
VOMITING: 0

## 2024-01-24 ENCOUNTER — HOSPITAL ENCOUNTER (EMERGENCY)
Age: 29
Discharge: HOME | End: 2024-01-24
Attending: EMERGENCY MEDICINE
Payer: MEDICAID

## 2024-01-24 ENCOUNTER — APPOINTMENT (OUTPATIENT)
Dept: CT IMAGING | Age: 29
End: 2024-01-24
Payer: MEDICAID

## 2024-01-24 VITALS
DIASTOLIC BLOOD PRESSURE: 81 MMHG | HEIGHT: 63 IN | BODY MASS INDEX: 36.33 KG/M2 | TEMPERATURE: 98.1 F | WEIGHT: 205.03 LBS | OXYGEN SATURATION: 98 % | SYSTOLIC BLOOD PRESSURE: 143 MMHG | HEART RATE: 90 BPM | RESPIRATION RATE: 18 BRPM

## 2024-01-24 DIAGNOSIS — R10.13 ABDOMINAL PAIN, EPIGASTRIC: Primary | ICD-10-CM

## 2024-01-24 LAB
ALBUMIN SERPL-MCNC: 4.6 G/DL (ref 3.5–5.2)
ALBUMIN/GLOB SERPL: 1.3 {RATIO} (ref 1–2.5)
ALP SERPL-CCNC: 107 U/L (ref 35–104)
ALT SERPL-CCNC: 28 U/L (ref 5–33)
ANION GAP SERPL CALCULATED.3IONS-SCNC: 11 MMOL/L (ref 9–17)
AST SERPL-CCNC: 23 U/L
BACTERIA URNS QL MICRO: ABNORMAL
BASOPHILS # BLD: 0 K/UL (ref 0–0.2)
BASOPHILS NFR BLD: 1 % (ref 0–2)
BILIRUB DIRECT SERPL-MCNC: <0.1 MG/DL
BILIRUB INDIRECT SERPL-MCNC: ABNORMAL MG/DL (ref 0–1)
BILIRUB SERPL-MCNC: 0.5 MG/DL (ref 0.3–1.2)
BILIRUB UR QL STRIP: NEGATIVE
BUN SERPL-MCNC: 15 MG/DL (ref 6–20)
CALCIUM SERPL-MCNC: 9.5 MG/DL (ref 8.6–10.4)
CHARACTER UR: ABNORMAL
CHLORIDE SERPL-SCNC: 102 MMOL/L (ref 98–107)
CLARITY UR: ABNORMAL
CO2 SERPL-SCNC: 25 MMOL/L (ref 20–31)
COLOR UR: YELLOW
CREAT SERPL-MCNC: 0.7 MG/DL (ref 0.5–0.9)
EOSINOPHIL # BLD: 0.1 K/UL (ref 0–0.4)
EOSINOPHILS RELATIVE PERCENT: 1 % (ref 1–4)
EPI CELLS #/AREA URNS HPF: ABNORMAL /HPF (ref 0–5)
ERYTHROCYTE [DISTWIDTH] IN BLOOD BY AUTOMATED COUNT: 14.7 % (ref 12.5–15.4)
GFR SERPL CREATININE-BSD FRML MDRD: >60 ML/MIN/1.73M2
GLUCOSE SERPL-MCNC: 81 MG/DL (ref 70–99)
GLUCOSE UR STRIP-MCNC: NEGATIVE MG/DL
HCG SERPL QL: NEGATIVE
HCT VFR BLD AUTO: 38.6 % (ref 36–46)
HGB BLD-MCNC: 12.9 G/DL (ref 12–16)
HGB UR QL STRIP.AUTO: ABNORMAL
KETONES UR STRIP-MCNC: NEGATIVE MG/DL
LEUKOCYTE ESTERASE UR QL STRIP: NEGATIVE
LIPASE SERPL-CCNC: 60 U/L (ref 13–60)
LYMPHOCYTES NFR BLD: 3.5 K/UL (ref 1–4.8)
LYMPHOCYTES RELATIVE PERCENT: 34 % (ref 24–44)
MCH RBC QN AUTO: 27.1 PG (ref 26–34)
MCHC RBC AUTO-ENTMCNC: 33.4 G/DL (ref 31–37)
MCV RBC AUTO: 81 FL (ref 80–100)
MONOCYTES NFR BLD: 0.6 K/UL (ref 0.1–1.2)
MONOCYTES NFR BLD: 6 % (ref 2–11)
NEUTROPHILS NFR BLD: 58 % (ref 36–66)
NEUTS SEG NFR BLD: 5.9 K/UL (ref 1.8–7.7)
NITRITE UR QL STRIP: NEGATIVE
PH UR STRIP: 6.5 [PH] (ref 5–8)
PLATELET # BLD AUTO: 309 K/UL (ref 140–450)
PMV BLD AUTO: 7.9 FL (ref 6–12)
POTASSIUM SERPL-SCNC: 3.7 MMOL/L (ref 3.7–5.3)
PROT SERPL-MCNC: 8.1 G/DL (ref 6.4–8.3)
PROT UR STRIP-MCNC: NEGATIVE MG/DL
RBC # BLD AUTO: 4.76 M/UL (ref 4–5.2)
RBC #/AREA URNS HPF: ABNORMAL /HPF (ref 0–2)
SODIUM SERPL-SCNC: 138 MMOL/L (ref 135–144)
SP GR UR STRIP: 1.02 (ref 1–1.03)
UROBILINOGEN UR STRIP-ACNC: NORMAL EU/DL (ref 0–1)
WBC #/AREA URNS HPF: ABNORMAL /HPF (ref 0–5)
WBC OTHER # BLD: 10.1 K/UL (ref 3.5–11)

## 2024-01-24 PROCEDURE — 74176 CT ABD & PELVIS W/O CONTRAST: CPT

## 2024-01-24 PROCEDURE — 85025 COMPLETE CBC W/AUTO DIFF WBC: CPT

## 2024-01-24 PROCEDURE — 87086 URINE CULTURE/COLONY COUNT: CPT

## 2024-01-24 PROCEDURE — 84703 CHORIONIC GONADOTROPIN ASSAY: CPT

## 2024-01-24 PROCEDURE — 2580000003 HC RX 258

## 2024-01-24 PROCEDURE — 99284 EMERGENCY DEPT VISIT MOD MDM: CPT

## 2024-01-24 PROCEDURE — 80048 BASIC METABOLIC PNL TOTAL CA: CPT

## 2024-01-24 PROCEDURE — 80076 HEPATIC FUNCTION PANEL: CPT

## 2024-01-24 PROCEDURE — 83690 ASSAY OF LIPASE: CPT

## 2024-01-24 PROCEDURE — 81001 URINALYSIS AUTO W/SCOPE: CPT

## 2024-01-24 RX ORDER — 0.9 % SODIUM CHLORIDE 0.9 %
80 INTRAVENOUS SOLUTION INTRAVENOUS ONCE
Status: DISCONTINUED | OUTPATIENT
Start: 2024-01-24 | End: 2024-01-24 | Stop reason: HOSPADM

## 2024-01-24 RX ORDER — 0.9 % SODIUM CHLORIDE 0.9 %
1000 INTRAVENOUS SOLUTION INTRAVENOUS ONCE
Status: COMPLETED | OUTPATIENT
Start: 2024-01-24 | End: 2024-01-24

## 2024-01-24 RX ORDER — SODIUM CHLORIDE 0.9 % (FLUSH) 0.9 %
10 SYRINGE (ML) INJECTION PRN
Status: DISCONTINUED | OUTPATIENT
Start: 2024-01-24 | End: 2024-01-24 | Stop reason: HOSPADM

## 2024-01-24 RX ORDER — FAMOTIDINE 20 MG/1
20 TABLET, FILM COATED ORAL 2 TIMES DAILY
Qty: 60 TABLET | Refills: 0 | Status: SHIPPED | OUTPATIENT
Start: 2024-01-24

## 2024-01-24 RX ADMIN — SODIUM CHLORIDE 1000 ML: 9 INJECTION, SOLUTION INTRAVENOUS at 19:53

## 2024-01-24 ASSESSMENT — ENCOUNTER SYMPTOMS
CHEST TIGHTNESS: 0
NAUSEA: 0
VOMITING: 0
EYE REDNESS: 0
SHORTNESS OF BREATH: 0
DIARRHEA: 0
CONSTIPATION: 0
ABDOMINAL PAIN: 1

## 2024-01-24 ASSESSMENT — PAIN DESCRIPTION - LOCATION: LOCATION: ABDOMEN

## 2024-01-24 ASSESSMENT — PAIN - FUNCTIONAL ASSESSMENT: PAIN_FUNCTIONAL_ASSESSMENT: 0-10

## 2024-01-24 ASSESSMENT — PAIN DESCRIPTION - PAIN TYPE: TYPE: ACUTE PAIN

## 2024-01-24 ASSESSMENT — PAIN SCALES - GENERAL: PAINLEVEL_OUTOF10: 1

## 2024-01-25 LAB
MICROORGANISM SPEC CULT: NORMAL
SPECIMEN DESCRIPTION: NORMAL

## 2024-01-25 NOTE — DISCHARGE INSTRUCTIONS
Evaluating your pain today-your labs were normal, CT abdomen pelvis was normal with no stones or other acute problem.    -Your abdominal pain could be caused from dyspepsia, reflux, start Pepcid 20 mg twice daily once in the morning and once at night until you see your primary care provider.     -You can take Tylenol every 6 hours as needed for pain (do not take more than 3000 mg in a 24-hour period).   Avoid taking NSAIDs until you talk to your primary care doctor if you do take no more than 600 mg-with food.     -Urinalysis had lots of skin cells and some bacteria so a culture is being done- the results should be accessible to you r Primary care provider however it does not look like it is a urinary tract infection, if you have urinary symptoms please call your primary care provider.       -Schedule appointment with your primary care provider in the next 3 to 5 days for follow-up abdominal pain and back pain, likely dyspepsia or reflux.    -If you have worsening symptoms with blood in stool, blood in urine, fever, chills, nausea, vomiting severe pain, chest pain, shortness of breath, or other concerning symptoms return to the emergency department.

## 2024-01-25 NOTE — ED PROVIDER NOTES
[facility] Emergency Department  [address]  [facility] [address]  Phone: [phone]  Fax: [phone]        Pt Name: Dolores Lewis  MRN: [id number]  Birthdate 1995  Date of evaluation: 1/24/24      CHIEF COMPLAINT     Chief Complaint   Patient presents with    Abdominal Pain     Abdominal pains that started today around 1600.  Moderate relief after taking Motrin 800mg.  Pt had cholecystectomy in 2022.          HISTORY OF PRESENT ILLNESS    Dolores Lewis is a 28 y.o. female who presents to our Emergency Department.    Complains of abdominal pain. Patient presents emerged part complaining of abdominal pain.  States it is in the epigastric region as well as right upper quadrant spreads around her.  Patient states that she has had similar symptoms in the past prior to her cholecystectomy.  States that is usually her gallbladder is causing her problems.  Patient denies any nausea or vomiting.  No diarrhea no constipation.  States that the pain is recons.  States that she took some Motrin at home which helped her pain and she feels much better at this time.  Denies any chest pain shortness of breath.  No headache.  No change in vision.          REVIEW OF SYSTEMS       Review of Systems   Constitutional:  Negative for chills, diaphoresis and fever.   HENT:  Negative for drooling.    Eyes:  Negative for redness.   Respiratory:  Negative for cough, chest tightness and shortness of breath.    Cardiovascular:  Negative for chest pain and palpitations.   Gastrointestinal:  Positive for abdominal pain. Negative for constipation, diarrhea, nausea and vomiting.   Genitourinary:  Negative for dysuria and hematuria.   Musculoskeletal:  Negative for neck stiffness.   Skin:  Negative for rash.   Neurological:  Negative for weakness, numbness and headaches.   Psychiatric/Behavioral:  Negative for agitation.        PAST MEDICAL HISTORY   No past medical history on file.    SURGICAL 
interpreted by the emergency physician with the below findings:    Interpretation per the Radiologist below, if available at the time of this note:    CT ABDOMEN PELVIS WO CONTRAST Additional Contrast? Radiologist Recommendation   Final Result   1.  No CT evidence of an acute intra-abdominal or intrapelvic process.   2.  No findings to suggest acute appendicitis; no ureter calculus or   hydronephrosis.   3. Cholecystectomy.               ED BEDSIDE ULTRASOUND:   Performed by ED Physician - none    LABS:  Labs Reviewed   HEPATIC FUNCTION PANEL - Abnormal; Notable for the following components:       Result Value    Alkaline Phosphatase 107 (*)     All other components within normal limits   URINALYSIS - Abnormal; Notable for the following components:    Turbidity UA SLIGHTLY CLOUDY (*)     Urine Hgb TRACE (*)     All other components within normal limits   MICROSCOPIC URINALYSIS - Abnormal; Notable for the following components:    Bacteria, UA MANY (*)     Other Observations UA   (*)     Value: Utilizing a urinalysis as the only screening method to exclude a potential uropathogen can be unreliable in many patient populations.  Rapid screening tests are less sensitive than culture and if UTI is a clinical possibility, culture should be considered despite a negative urinalysis.      All other components within normal limits   CULTURE, URINE   CBC WITH AUTO DIFFERENTIAL   BASIC METABOLIC PANEL   LIPASE   HCG, SERUM, QUALITATIVE       All other labs were within normal range or not returned as of this dictation.    EMERGENCY DEPARTMENT COURSE and DIFFERENTIAL DIAGNOSIS/MDM:   Vitals:    Vitals:    01/24/24 1815   BP: (!) 143/81   Pulse: 90   Resp: 18   Temp: 98.1 °F (36.7 °C)   TempSrc: Oral   SpO2: 98%   Weight: 93 kg (205 lb 0.4 oz)   Height: 1.6 m (5' 2.99\")           Medical Decision Making  Amount and/or Complexity of Data Reviewed  Labs: ordered.  Radiology: ordered.    Risk  Prescription drug 
- 16.0 g/dL    Hematocrit 38.6 36 - 46 %    MCV 81.0 80 - 100 fL    MCH 27.1 26 - 34 pg    MCHC 33.4 31 - 37 g/dL    RDW 14.7 12.5 - 15.4 %    Platelets 309 140 - 450 k/uL    MPV 7.9 6.0 - 12.0 fL    Neutrophils % 58 36 - 66 %    Lymphocytes % 34 24 - 44 %    Monocytes % 6 2 - 11 %    Eosinophils % 1 1 - 4 %    Basophils % 1 0 - 2 %    Neutrophils Absolute 5.90 1.8 - 7.7 k/uL    Lymphocytes Absolute 3.50 1.0 - 4.8 k/uL    Monocytes Absolute 0.60 0.1 - 1.2 k/uL    Eosinophils Absolute 0.10 0.0 - 0.4 k/uL    Basophils Absolute 0.00 0.0 - 0.2 k/uL   BMP   Result Value Ref Range    Sodium 138 135 - 144 mmol/L    Potassium 3.7 3.7 - 5.3 mmol/L    Chloride 102 98 - 107 mmol/L    CO2 25 20 - 31 mmol/L    Anion Gap 11 9 - 17 mmol/L    Glucose 81 70 - 99 mg/dL    BUN 15 6 - 20 mg/dL    Creatinine 0.7 0.5 - 0.9 mg/dL    Est, Glom Filt Rate >60 >60 mL/min/1.73m2    Calcium 9.5 8.6 - 10.4 mg/dL   Hepatic Function Panel   Result Value Ref Range    Albumin 4.6 3.5 - 5.2 g/dL    Alkaline Phosphatase 107 (H) 35 - 104 U/L    ALT 28 5 - 33 U/L    AST 23 <32 U/L    Total Bilirubin 0.5 0.3 - 1.2 mg/dL    Bilirubin, Direct <0.1 <0.3 mg/dL    Bilirubin, Indirect Can not be calculated 0.0 - 1.0 mg/dL    Total Protein 8.1 6.4 - 8.3 g/dL    Albumin/Globulin Ratio 1.3 1.0 - 2.5   Lipase   Result Value Ref Range    Lipase 60 13 - 60 U/L   Urinalysis   Result Value Ref Range    Color, UA Yellow Yellow    Turbidity UA SLIGHTLY CLOUDY (A) Clear    Glucose, Ur NEGATIVE NEGATIVE mg/dL    Bilirubin Urine NEGATIVE NEGATIVE    Ketones, Urine NEGATIVE NEGATIVE mg/dL    Specific Gravity, UA 1.025 1.005 - 1.030    Urine Hgb TRACE (A) NEGATIVE    pH, UA 6.5 5.0 - 8.0    Protein, UA NEGATIVE NEGATIVE mg/dL    Urobilinogen, Urine Normal 0.0 - 1.0 EU/dL    Nitrite, Urine NEGATIVE NEGATIVE    Leukocyte Esterase, Urine NEGATIVE NEGATIVE   HCG Qualitative, Serum   Result Value Ref Range    hCG Qual NEGATIVE NEGATIVE   Microscopic Urinalysis   Result Value Ref

## 2025-05-15 ENCOUNTER — TELEPHONE (OUTPATIENT)
Dept: FAMILY MEDICINE CLINIC | Age: 30
End: 2025-05-15

## 2025-05-15 NOTE — TELEPHONE ENCOUNTER
Writer spoke with patient- patient reports that she has been experiencing abdominal pain and diarrhea she believes may be related to anxiety/stress. Patient would like to discuss with provider prior to her leaving town on 6/4. PCP does not have any openings until 6/16 please advise if there is anywhere pt can be squeezed to be seen sooner.     Last Visit Date: Visit date not found   Next Visit Date: Visit date not found

## 2025-05-15 NOTE — TELEPHONE ENCOUNTER
----- Message from Luana RODRIGUEZ sent at 5/15/2025  1:43 PM EDT -----  Regarding: [facility] Appointment Request  [facility] Appointment Request    Patient needs appointment for E[facility] Appointment Type: Existing Condition Follow Up.    Patient Requested Dates(s):before June 02,2025  Patient Requested Time:afternoon  Provider Name:Marianna Lal, JORY-NYDIA    Reason for Appointment Request: Other check up and patient have a concern that wanted to discuss with her pcp and she did not experience any symptoms right now.  --------------------------------------------------------------------------------------------------------------------------    Relationship to Patient: Self     Call Back Information: OK to leave message on [id number]  Preferred Call Back Number: Phone [phone]

## 2025-05-21 ENCOUNTER — OFFICE VISIT (OUTPATIENT)
Dept: PRIMARY CARE CLINIC | Age: 30
End: 2025-05-21
Payer: MEDICAID

## 2025-05-21 VITALS
HEIGHT: 62 IN | DIASTOLIC BLOOD PRESSURE: 78 MMHG | BODY MASS INDEX: 38.61 KG/M2 | RESPIRATION RATE: 18 BRPM | HEART RATE: 74 BPM | SYSTOLIC BLOOD PRESSURE: 108 MMHG | OXYGEN SATURATION: 99 % | WEIGHT: 209.8 LBS

## 2025-05-21 DIAGNOSIS — F41.9 ANXIETY: ICD-10-CM

## 2025-05-21 DIAGNOSIS — R19.7 DIARRHEA, UNSPECIFIED TYPE: ICD-10-CM

## 2025-05-21 DIAGNOSIS — R10.9 NONSPECIFIC ABDOMINAL PAIN: Primary | ICD-10-CM

## 2025-05-21 DIAGNOSIS — L74.0 HEAT RASH: ICD-10-CM

## 2025-05-21 PROCEDURE — 99214 OFFICE O/P EST MOD 30 MIN: CPT | Performed by: NURSE PRACTITIONER

## 2025-05-21 RX ORDER — FAMOTIDINE 40 MG/1
40 TABLET, FILM COATED ORAL EVERY EVENING
Qty: 90 TABLET | Refills: 3 | Status: SHIPPED | OUTPATIENT
Start: 2025-05-21

## 2025-05-21 RX ORDER — ONDANSETRON 4 MG/1
4 TABLET, ORALLY DISINTEGRATING ORAL EVERY 8 HOURS PRN
COMMUNITY
Start: 2025-04-03

## 2025-05-21 RX ORDER — TRIAMCINOLONE ACETONIDE 1 MG/G
OINTMENT TOPICAL 2 TIMES DAILY
Qty: 30 G | Refills: 1 | Status: SHIPPED | OUTPATIENT
Start: 2025-05-21 | End: 2025-05-28

## 2025-05-21 RX ORDER — HYDROXYZINE HYDROCHLORIDE 25 MG/1
25 TABLET, FILM COATED ORAL EVERY 8 HOURS PRN
Qty: 60 TABLET | Refills: 1 | Status: SHIPPED | OUTPATIENT
Start: 2025-05-21

## 2025-05-21 RX ORDER — ALPRAZOLAM 0.5 MG
0.5 TABLET ORAL 2 TIMES DAILY PRN
Qty: 4 TABLET | Refills: 0 | Status: SHIPPED | OUTPATIENT
Start: 2025-05-21 | End: 2025-05-23

## 2025-05-21 SDOH — ECONOMIC STABILITY: FOOD INSECURITY: WITHIN THE PAST 12 MONTHS, THE FOOD YOU BOUGHT JUST DIDN'T LAST AND YOU DIDN'T HAVE MONEY TO GET MORE.: NEVER TRUE

## 2025-05-21 SDOH — ECONOMIC STABILITY: FOOD INSECURITY: WITHIN THE PAST 12 MONTHS, YOU WORRIED THAT YOUR FOOD WOULD RUN OUT BEFORE YOU GOT MONEY TO BUY MORE.: NEVER TRUE

## 2025-05-21 ASSESSMENT — PATIENT HEALTH QUESTIONNAIRE - PHQ9
SUM OF ALL RESPONSES TO PHQ QUESTIONS 1-9: 0
1. LITTLE INTEREST OR PLEASURE IN DOING THINGS: NOT AT ALL
SUM OF ALL RESPONSES TO PHQ QUESTIONS 1-9: 0
2. FEELING DOWN, DEPRESSED OR HOPELESS: NOT AT ALL
SUM OF ALL RESPONSES TO PHQ QUESTIONS 1-9: 0
SUM OF ALL RESPONSES TO PHQ QUESTIONS 1-9: 0

## 2025-05-21 NOTE — PROGRESS NOTES
[facility]  [facility] PRIMARY CARE  [address] [place] [address] [address]  [address]  [facility] [address]  Dept: [phone]  Dept Fax: [phone]    Dolores Lewis is a 30 y.o. female who presentstoday for her medical conditions/complaints as noted below.  Dolores Lewis is c/o of  Chief Complaint   Patient presents with    Abdominal Pain     discuss abdominal pain/diarrhea- possibly related to stress           HPI:     History of Present Illness  The patient presents for evaluation of abdominal pain, diarrhea, and sun allergy.    She has been experiencing severe diarrhea for the past 2 weeks, which she attributes to anxiety related to an upcoming travel plan. This is a recurrent issue that typically manifests a week prior to her travel but has not previously persisted for this duration. She reports daily episodes of diarrhea, characterized by loose or watery stools depending on her diet, occurring 3 to 4 times a day. She also experiences constant abdominal discomfort, which she describes as a gurgling sensation rather than pain. Occasional nausea is also reported. She expresses concern about the potential for constipation due to the frequent diarrhea.     She recalls a previous visit approximately 2.5 years ago, during which she was prescribed Xanax, of which she only took half a pill due to fear of excessive sedation while traveling alone with her children. She found the medication effective and is considering its use for her upcoming trip, during which she will be accompanied by her  and father. She has a history of gallbladder removal and has been experiencing digestive issues since then, including occasional vomiting. She has sought emergency care for these symptoms in the past and was prescribed Pepcid, which she found more effective than Tums. She currently has a supply of Pepcid at home but is unsure if it will last through the summer.    She reports waking up in the middle of the

## 2025-05-24 ASSESSMENT — ENCOUNTER SYMPTOMS
SORE THROAT: 0
DIARRHEA: 1
CHEST TIGHTNESS: 0
COUGH: 0
EYE ITCHING: 0
WHEEZING: 0
SINUS PRESSURE: 0
EYE DISCHARGE: 0
TROUBLE SWALLOWING: 0
EYE REDNESS: 0
VOMITING: 0
SINUS PAIN: 0
ABDOMINAL PAIN: 0
NAUSEA: 0
SHORTNESS OF BREATH: 0

## (undated) DEVICE — SUTURE V-LOC 180 SZ 3-0 L6IN ABSRB GRN L17MM CV-23 1/2 CIR VLOCL0804

## (undated) DEVICE — CANNULA SEAL

## (undated) DEVICE — 40580 - THE PINK PAD - ADVANCED TRENDELENBURG POSITIONING KIT: Brand: 40580 - THE PINK PAD - ADVANCED TRENDELENBURG POSITIONING KIT

## (undated) DEVICE — SUCTION IRRIGATOR: Brand: ENDOWRIST

## (undated) DEVICE — TIP COVER ACCESSORY

## (undated) DEVICE — BLADELESS OBTURATOR: Brand: WECK VISTA

## (undated) DEVICE — ANCHOR TISSUE RETRIEVAL SYSTEM, BAG SIZE 125 ML, PORT SIZE 8 MM: Brand: ANCHOR TISSUE RETRIEVAL SYSTEM

## (undated) DEVICE — PLUMEPORT LAPAROSCOPIC SMOKE FILTRATION DEVICE: Brand: PLUMEPORT ACTIV

## (undated) DEVICE — SYRINGE MED 10ML LUERLOCK TIP W/O SFTY DISP

## (undated) DEVICE — BLANKET WRM W29.9XL79.1IN UP BODY FORC AIR MISTRAL-AIR

## (undated) DEVICE — ADHESIVE SKIN CLSR 0.7ML TOP DERMBND ADV

## (undated) DEVICE — GOWN,AURORA,NONRNF,XL,30/CS: Brand: MEDLINE

## (undated) DEVICE — SOLUTION IV IRRIG POUR BRL 0.9% SODIUM CHL 2F7124

## (undated) DEVICE — MHPB BASIC LAP PACK: Brand: MEDLINE INDUSTRIES, INC.

## (undated) DEVICE — SUTURE MCRYL + SZ 4-0 L27IN ABSRB UD L19MM PS-2 3/8 CIR MCP426H

## (undated) DEVICE — SOLUTION ANTIFOG VIS SYS CLEARIFY LAPSCP

## (undated) DEVICE — NEEDLE INSUF L120MM DIA2MM DISP FOR PNEUMOPERI ENDOPATH

## (undated) DEVICE — CLIP INT L POLYMER LOK LIG HEM O LOK

## (undated) DEVICE — STRAP,POSITIONING,KNEE/BODY,FOAM,4X60": Brand: MEDLINE

## (undated) DEVICE — CHLORAPREP 26ML ORANGE

## (undated) DEVICE — GLOVE ORANGE PI 7   MSG9070

## (undated) DEVICE — PROTECTOR ULN NRV PUR FOAM HK LOOP STRP ANATOMICALLY

## (undated) DEVICE — ELECTRODE PT RET AD L9FT HI MOIST COND ADH HYDRGEL CORDED

## (undated) DEVICE — PENCIL ES L3M BTTN SWCH HOLSTER W/ BLDE ELECTRD EDGE

## (undated) DEVICE — ARM DRAPE